# Patient Record
Sex: MALE | Race: OTHER | Employment: FULL TIME | URBAN - METROPOLITAN AREA
[De-identification: names, ages, dates, MRNs, and addresses within clinical notes are randomized per-mention and may not be internally consistent; named-entity substitution may affect disease eponyms.]

---

## 2018-07-06 ENCOUNTER — HOSPITAL ENCOUNTER (EMERGENCY)
Age: 38
Discharge: HOME | End: 2018-07-06
Attending: EMERGENCY MEDICINE | Admitting: EMERGENCY MEDICINE
Payer: COMMERCIAL

## 2018-07-06 ENCOUNTER — APPOINTMENT (OUTPATIENT)
Dept: CT IMAGING | Age: 38
End: 2018-07-06
Attending: NURSE PRACTITIONER
Payer: COMMERCIAL

## 2018-07-06 VITALS
SYSTOLIC BLOOD PRESSURE: 127 MMHG | DIASTOLIC BLOOD PRESSURE: 85 MMHG | TEMPERATURE: 98.3 F | OXYGEN SATURATION: 99 % | WEIGHT: 138.45 LBS | HEART RATE: 84 BPM | RESPIRATION RATE: 18 BRPM

## 2018-07-06 DIAGNOSIS — J02.9 SORE THROAT: Primary | ICD-10-CM

## 2018-07-06 PROCEDURE — 99282 EMERGENCY DEPT VISIT SF MDM: CPT

## 2018-07-06 PROCEDURE — 70490 CT SOFT TISSUE NECK W/O DYE: CPT

## 2018-07-06 NOTE — DISCHARGE INSTRUCTIONS
Sore Throat: Care Instructions  Your Care Instructions    Infection by bacteria or a virus causes most sore throats. Cigarette smoke, dry air, air pollution, allergies, and yelling can also cause a sore throat. Sore throats can be painful and annoying. Fortunately, most sore throats go away on their own. If you have a bacterial infection, your doctor may prescribe antibiotics. Follow-up care is a key part of your treatment and safety. Be sure to make and go to all appointments, and call your doctor if you are having problems. It's also a good idea to know your test results and keep a list of the medicines you take. How can you care for yourself at home? · If your doctor prescribed antibiotics, take them as directed. Do not stop taking them just because you feel better. You need to take the full course of antibiotics. · Gargle with warm salt water once an hour to help reduce swelling and relieve discomfort. Use 1 teaspoon of salt mixed in 1 cup of warm water. · Take an over-the-counter pain medicine, such as acetaminophen (Tylenol), ibuprofen (Advil, Motrin), or naproxen (Aleve). Read and follow all instructions on the label. · Be careful when taking over-the-counter cold or flu medicines and Tylenol at the same time. Many of these medicines have acetaminophen, which is Tylenol. Read the labels to make sure that you are not taking more than the recommended dose. Too much acetaminophen (Tylenol) can be harmful. · Drink plenty of fluids. Fluids may help soothe an irritated throat. Hot fluids, such as tea or soup, may help decrease throat pain. · Use over-the-counter throat lozenges to soothe pain. Regular cough drops or hard candy may also help. These should not be given to young children because of the risk of choking. · Do not smoke or allow others to smoke around you. If you need help quitting, talk to your doctor about stop-smoking programs and medicines.  These can increase your chances of quitting for good. · Use a vaporizer or humidifier to add moisture to your bedroom. Follow the directions for cleaning the machine. When should you call for help? Call your doctor now or seek immediate medical care if:  ? · You have new or worse trouble swallowing. ? · Your sore throat gets much worse on one side. ? Watch closely for changes in your health, and be sure to contact your doctor if you do not get better as expected. Where can you learn more? Go to http://ora-valerio.info/. Enter [phone] [id number] in the search box to learn more about \"Sore Throat: Care Instructions. \"  Current as of: May 12, 2017  Content Version: 11.4  © 8339-4868 Healthwise, Incorporated. Care instructions adapted under license by [employer] (which disclaims liability or warranty for this information). If you have questions about a medical condition or this instruction, always ask your healthcare professional. [address] any warranty or liability for your use of this information.

## 2018-07-06 NOTE — LETTER
[facility] EMERGENC[facility] DEPT 
[address] [address] 
[phone] Work/School Note Date: 7/6/2018 To Whom It May concern: 
 
Ruperto Neely was seen and treated today in the emergency room by the following provider(s): 
Attending Provider: Meg Moreno MD 
Nurse Practitioner: Ramana Singer NP.    
 
 
Sincerely, 
 
 
 
 
Ramana Singer NP

## 2018-07-06 NOTE — ED PROVIDER NOTES
HPI Comments: 45 y.o. male with no significant past medical history who presents to the ED with chief complaint of throat pain. Pt reports 2 nights ago at about 2200 he was eating chicken wings when he \"felt like something got stuck in his throat. \" Pt reports since then he has had throat pain that is exacerbated by swallowing and has been \"coughing up a little bit of blood\" since 2 nights ago, says he continues to feel a foreign body sensation especially when he eats or drinks. Pt states he is able to swallow but it is painful. Pt states he is unsure if he swallowed a bone. Pt states he was seen at [facility] today for a f/u for a dental infection and he told them about his current issue but they said they were unable to do an x-ray so he was referred to the ED for further evaluation. Pt denies SOB, fever, chills, nausea, or vomiting. There are no other acute medical complaints voiced at this time. Social Hx: . PCP: No primary care provider on file. Note written by Demetrice Marmolejo, as dictated by Silva Olea NP 2:22 PM     The history is provided by the patient and the spouse. No past medical history on file. No past surgical history on file. No family history on file. Social History     Social History    Marital status: N/A     Spouse name: N/A    Number of children: N/A    Years of education: N/A     Occupational History    Not on file. Social History Main Topics    Smoking status: Not on file    Smokeless tobacco: Not on file    Alcohol use Not on file    Drug use: Not on file    Sexual activity: Not on file     Other Topics Concern    Not on file     Social History Narrative         ALLERGIES: Review of patient's allergies indicates no known allergies. Review of Systems   Constitutional: Negative for activity change, appetite change, chills, fatigue and fever.    HENT: Negative for congestion, ear discharge, ear pain, sinus pain, sinus pressure, sore throat and trouble swallowing.         +throat pain with foreign body sensation   Eyes: Negative for photophobia, pain, redness, itching and visual disturbance. Respiratory: Positive for cough (hemoptysis). Negative for chest tightness and shortness of breath. Cardiovascular: Negative for chest pain and palpitations. Gastrointestinal: Negative for abdominal distention, abdominal pain, nausea and vomiting. Endocrine: Negative. Genitourinary: Negative for difficulty urinating, frequency and urgency. Musculoskeletal: Negative for back pain, neck pain and neck stiffness. Skin: Negative for color change, pallor, rash and wound. Allergic/Immunologic: Negative. Neurological: Negative for dizziness, syncope, weakness and headaches. Hematological: Does not bruise/bleed easily. Psychiatric/Behavioral: Negative for behavioral problems. The patient is not nervous/anxious. All other systems reviewed and are negative. Vitals:    07/06/18 1357   BP: 127/85   Pulse: 84   Resp: 18   Temp: 98.3 °F (36.8 °C)   SpO2: 99%   Weight: 62.8 kg (138 lb 7.2 oz)            Physical Exam   Constitutional: He is oriented to person, place, and time. He appears well-developed and well-nourished. No distress. HENT:   Head: Normocephalic and atraumatic. Right Ear: External ear normal.   Left Ear: External ear normal.   Nose: Nose normal.   Mouth/Throat: Oropharynx is clear and moist.   Patient handling secretions without difficulty. Speech clear. Eyes: Conjunctivae and EOM are normal. Pupils are equal, round, and reactive to light. Right eye exhibits no discharge. Left eye exhibits no discharge. Neck: Normal range of motion. Neck supple. No JVD present. No tracheal deviation present. Cardiovascular: Normal rate, regular rhythm, normal heart sounds and intact distal pulses. Exam reveals no gallop. No murmur heard. Pulmonary/Chest: Effort normal and breath sounds normal. No respiratory distress.  He has no wheezes. He has no rales. He exhibits no tenderness. Abdominal: Soft. Bowel sounds are normal. He exhibits no distension. There is no tenderness. There is no rebound and no guarding. Genitourinary:   Genitourinary Comments: Negative     Musculoskeletal: Normal range of motion. He exhibits no edema or tenderness. Neurological: He is alert and oriented to person, place, and time. Skin: Skin is warm and dry. No rash noted. No erythema. No pallor. Psychiatric: He has a normal mood and affect. His behavior is normal. Judgment and thought content normal.   Nursing note and vitals reviewed. MDM  Number of Diagnoses or Management Options  Sore throat: new and requires workup  Diagnosis management comments: Plan:  Discharge to home and follow up with PCP. Follow up with GI as an outpatient. Amount and/or Complexity of Data Reviewed  Tests in the radiology section of CPT®: ordered and reviewed  Discuss the patient with other providers: yes (Discussed plan of care with Dr. Kristine Roe.)          ED Course     3:30 PM  Pt has been reexamined. Pt has no new complaints, changes or physical findings. Care plan outlined and precautions discussed. All available results were reviewed with pt. All medications were reviewed with pt. All of pt's questions and concerns were addressed. Pt agrees to F/U as instructed and agrees to return to ED upon further deterioration. Pt is ready to go home.   Mona Messer NP      Procedures

## 2018-07-06 NOTE — ED TRIAGE NOTES
Triage Note: Per patient, he was eating some chicken and rice last night and felt like he had a piece of bone in his throat. [facility] and they [facility] him [facility] medicine but couldn't do an x-ray and referred him here. He reports that the pain is worse when eating.

## 2018-08-07 ENCOUNTER — HOSPITAL ENCOUNTER (EMERGENCY)
Age: 38
Discharge: HOME | End: 2018-08-07
Attending: EMERGENCY MEDICINE | Admitting: EMERGENCY MEDICINE
Payer: COMMERCIAL

## 2018-08-07 VITALS
OXYGEN SATURATION: 99 % | DIASTOLIC BLOOD PRESSURE: 77 MMHG | SYSTOLIC BLOOD PRESSURE: 123 MMHG | TEMPERATURE: 98 F | HEART RATE: 89 BPM | WEIGHT: 133 LBS | RESPIRATION RATE: 18 BRPM | BODY MASS INDEX: 21.38 KG/M2 | HEIGHT: 66 IN

## 2018-08-07 DIAGNOSIS — B37.0 THRUSH OF MOUTH AND ESOPHAGUS (HCC): Primary | ICD-10-CM

## 2018-08-07 DIAGNOSIS — B37.81 THRUSH OF MOUTH AND ESOPHAGUS (HCC): Primary | ICD-10-CM

## 2018-08-07 LAB
ALBUMIN SERPL-MCNC: 3.7 G/DL (ref 3.5–5)
ALBUMIN/GLOB SERPL: 0.9 {RATIO} (ref 1.1–2.2)
ALP SERPL-CCNC: 78 U/L (ref 45–117)
ALT SERPL-CCNC: 25 U/L (ref 12–78)
ANION GAP SERPL CALC-SCNC: 7 MMOL/L (ref 5–15)
AST SERPL-CCNC: 20 U/L (ref 15–37)
BASOPHILS # BLD: 0 K/UL (ref 0–0.1)
BASOPHILS NFR BLD: 0 % (ref 0–1)
BILIRUB SERPL-MCNC: 0.3 MG/DL (ref 0.2–1)
BUN SERPL-MCNC: 10 MG/DL (ref 6–20)
BUN/CREAT SERPL: 11 (ref 12–20)
CALCIUM SERPL-MCNC: 8.9 MG/DL (ref 8.5–10.1)
CHLORIDE SERPL-SCNC: 99 MMOL/L (ref 97–108)
CO2 SERPL-SCNC: 28 MMOL/L (ref 21–32)
CREAT SERPL-MCNC: 0.91 MG/DL (ref 0.7–1.3)
DIFFERENTIAL METHOD BLD: ABNORMAL
EOSINOPHIL # BLD: 0.6 K/UL (ref 0–0.4)
EOSINOPHIL NFR BLD: 7 % (ref 0–7)
ERYTHROCYTE [DISTWIDTH] IN BLOOD BY AUTOMATED COUNT: 12.1 % (ref 11.5–14.5)
GLOBULIN SER CALC-MCNC: 4.1 G/DL (ref 2–4)
GLUCOSE SERPL-MCNC: 87 MG/DL (ref 65–100)
HCT VFR BLD AUTO: 42.8 % (ref 36.6–50.3)
HGB BLD-MCNC: 14.8 G/DL (ref 12.1–17)
IMM GRANULOCYTES # BLD: 0 K/UL (ref 0–0.04)
IMM GRANULOCYTES NFR BLD AUTO: 0 % (ref 0–0.5)
LYMPHOCYTES # BLD: 1.6 K/UL (ref 0.8–3.5)
LYMPHOCYTES NFR BLD: 20 % (ref 12–49)
MCH RBC QN AUTO: 29.4 PG (ref 26–34)
MCHC RBC AUTO-ENTMCNC: 34.6 G/DL (ref 30–36.5)
MCV RBC AUTO: 85.1 FL (ref 80–99)
MONOCYTES # BLD: 0.7 K/UL (ref 0–1)
MONOCYTES NFR BLD: 9 % (ref 5–13)
NEUTS SEG # BLD: 5.4 K/UL (ref 1.8–8)
NEUTS SEG NFR BLD: 65 % (ref 32–75)
NRBC # BLD: 0 K/UL (ref 0–0.01)
NRBC BLD-RTO: 0 PER 100 WBC
PLATELET # BLD AUTO: 306 K/UL (ref 150–400)
PMV BLD AUTO: 9.2 FL (ref 8.9–12.9)
POTASSIUM SERPL-SCNC: 4.2 MMOL/L (ref 3.5–5.1)
PROT SERPL-MCNC: 7.8 G/DL (ref 6.4–8.2)
RBC # BLD AUTO: 5.03 M/UL (ref 4.1–5.7)
SODIUM SERPL-SCNC: 134 MMOL/L (ref 136–145)
WBC # BLD AUTO: 8.3 K/UL (ref 4.1–11.1)

## 2018-08-07 PROCEDURE — 74011250637 HC RX REV CODE- 250/637: Performed by: PHYSICIAN ASSISTANT

## 2018-08-07 PROCEDURE — 74011000250 HC RX REV CODE- 250: Performed by: PHYSICIAN ASSISTANT

## 2018-08-07 PROCEDURE — 74011250636 HC RX REV CODE- 250/636: Performed by: PHYSICIAN ASSISTANT

## 2018-08-07 PROCEDURE — 96360 HYDRATION IV INFUSION INIT: CPT

## 2018-08-07 PROCEDURE — 36415 COLL VENOUS BLD VENIPUNCTURE: CPT | Performed by: PHYSICIAN ASSISTANT

## 2018-08-07 PROCEDURE — 99283 EMERGENCY DEPT VISIT LOW MDM: CPT

## 2018-08-07 PROCEDURE — 80053 COMPREHEN METABOLIC PANEL: CPT | Performed by: PHYSICIAN ASSISTANT

## 2018-08-07 PROCEDURE — 85025 COMPLETE CBC W/AUTO DIFF WBC: CPT | Performed by: PHYSICIAN ASSISTANT

## 2018-08-07 RX ORDER — ITRACONAZOLE 100 MG/1
100 CAPSULE ORAL 2 TIMES DAILY
Qty: 14 CAP | Refills: 0 | Status: SHIPPED | OUTPATIENT
Start: 2018-08-07 | End: 2018-08-14

## 2018-08-07 RX ORDER — LIDOCAINE HYDROCHLORIDE 20 MG/ML
15 SOLUTION OROPHARYNGEAL AS NEEDED
COMMUNITY
End: 2018-08-22

## 2018-08-07 RX ADMIN — LIDOCAINE HYDROCHLORIDE 40 ML: 20 SOLUTION ORAL; TOPICAL at 18:08

## 2018-08-07 RX ADMIN — SODIUM CHLORIDE 1000 ML: 900 INJECTION, SOLUTION INTRAVENOUS at 18:09

## 2018-08-07 NOTE — ED NOTES
ESPERANZA Roberts gave and reviewed discharge instructions with the patient. The patient verbalized understanding. The patient was given opportunity for questions. Patient discharged in stable condition to the waiting room.

## 2018-08-07 NOTE — LETTER
[address] 
[facility] EM[facility]GENCY DEPT 
[address] [facility] [address] [id number] [phone] Work/School Note Date: 8/7/2018 To Whom It May concern: 
 
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 
Physician Assistant: ESPERANZA Gilliam. Elder Marylene Si was seen in the ED on 8/7/18. Sincerely, Valiant Councilman, RN

## 2018-08-07 NOTE — ED NOTES
Patient tolerating PO water, but with pain. PA aware. Verbal report was given to [facility], RN who will assume care of patient at this time. Receiving nurse had an opportunity to ask questions and seek clarifications. Receiving nurse aware of pending lab results and orders that need to be completed.

## 2018-08-07 NOTE — DISCHARGE INSTRUCTIONS
Candidiasis: Care Instructions  Your Care Instructions  Candidiasis (say \"qzc-hix-GM-uh-bianca\") is a yeast infection. Yeast normally lives in your body. But it can cause problems if your body's defenses don't work as they should. Some medicines can increase your chance of getting a yeast infection. These include antibiotics, steroids, and cancer drugs. And some diseases like AIDS and diabetes can make you more likely to get yeast infections. There are different types of yeast infections. Cristal Apt is a yeast infection in the mouth. It usually occurs in people with weak immune systems. It causes white patches inside the mouth and throat. Yeast infections of the skin usually occur in skin folds where the skin stays moist. They cause red, oozing patches on your skin. Babies can get these infections under the diaper. People who often wear gloves can get them on their hands. Many women get vaginal yeast infections. They are most common when women take antibiotics. These infections can cause the vagina to itch and burn. They also cause white discharge that looks like cottage cheese. In rare cases, yeast infects the blood. This can cause serious disease. This kind of infection is treated with medicine given through a needle into a vein (IV). After you start treatment, a yeast infection usually goes away quickly. But if your immune system is weak, the infection may come back. Tell your doctor if you get yeast infections often. Follow-up care is a key part of your treatment and safety. Be sure to make and go to all appointments, and call your doctor if you are having problems. It's also a good idea to know your test results and keep a list of the medicines you take. How can you care for yourself at home? · Take your medicines exactly as prescribed. Call your doctor if you think you are having a problem with your medicine. · Use antibiotics only as directed by your doctor. · Eat yogurt with live cultures.  It has bacteria called lactobacillus. It may help prevent some types of yeast infections. · Keep your skin clean and dry. Put powder on moist places. · If you are using a cream or suppository to treat a vaginal yeast infection, don't use condoms or a diaphragm. Use a different type of birth control. · Eat a healthy diet and get regular exercise. This will help keep your immune system strong. When should you call for help? Watch closely for changes in your health, and be sure to contact your doctor if:    · You do not get better as expected. Where can you learn more? Go to http://oraTagManvalerio.info/. Enter K555 in the search box to learn more about \"Candidiasis: Care Instructions. \"  Current as of: October 6, 2017  Content Version: 11.7  © 1534-8677 [employer]. Care instructions adapted under license by [employer] (which disclaims liability or warranty for this information). If you have questions about a medical condition or this instruction, always ask your healthcare professional. [address] [address] any warranty or liability for your use of this information. We hope that we have addressed all of your medical concerns. The examination and treatment you received in the Emergency Department were for an emergent problem and were not intended as complete care. It is important that you follow up with your healthcare provider(s) for ongoing care. If your symptoms worsen or do not improve as expected, and you are unable to reach your usual health care provider(s), you should return to the Emergency Department. Today's healthcare is undergoing tremendous change, and patient satisfaction surveys are one of the many tools to assess the quality of medical care. You may receive a survey from the [employer] regarding your experience in the Emergency Department.   I hope that your experience has been completely positive, particularly the medical care that I provided. As such, please participate in the survey; anything less than excellent does not meet my expectations or intentions. [address] and [facility] participate in nationally recognized quality of care measures. If your blood pressure is greater than 120/80, as reported below, we urge that you seek medical care to address the potential of high blood pressure, commonly known as hypertension. Hypertension can be hereditary or can be caused by certain medical conditions, pain, stress, or \"white coat syndrome. \"       Please make an appointment with your health care provider(s) for follow up of your Emergency Department visit. VITALS:   Patient Vitals for the past 8 hrs:   Temp Pulse Resp BP SpO2   08/07/18 1727 98 °F (36.7 °C) (!) 101 18 125/75 99 %          Thank you for allowing us to provide you with medical care today. We realize that you have many choices for your emergency care needs. Please choose us in the future for any continued health care needs. [facility], [address] [place] [address].   Office: [phone]            Recent Results (from the past 24 hour(s))   CBC WITH AUTOMATED DIFF    Collection Time: 08/07/18  6:02 PM   Result Value Ref Range    WBC 8.3 4.1 - 11.1 K/uL    RBC 5.03 4. 10 - 5.70 M/uL    HGB 14.8 12.1 - 17.0 g/dL    HCT 42.8 36.6 - 50.3 %    MCV 85.1 80.0 - 99.0 FL    MCH 29.4 26.0 - 34.0 PG    MCHC 34.6 30.0 - 36.5 g/dL    RDW 12.1 11.5 - 14.5 %    PLATELET 263 301 - 131 K/uL    MPV 9.2 8.9 - 12.9 FL    NRBC 0.0 0  WBC    ABSOLUTE NRBC 0.00 0.00 - 0.01 K/uL    NEUTROPHILS 65 32 - 75 %    LYMPHOCYTES 20 12 - 49 %    MONOCYTES 9 5 - 13 %    EOSINOPHILS 7 0 - 7 %    BASOPHILS 0 0 - 1 %    IMMATURE GRANULOCYTES 0 0.0 - 0.5 %    ABS. NEUTROPHILS 5.4 1.8 - 8.0 K/UL    ABS. LYMPHOCYTES 1.6 0.8 - 3.5 K/UL    ABS. MONOCYTES 0.7 0.0 - 1.0 K/UL    ABS.  EOSINOPHILS 0.6 (H) 0.0 - 0.4 K/UL    ABS. BASOPHILS 0.0 0.0 - 0.1 K/UL    ABS. IMM. GRANS. 0.0 0.00 - 0.04 K/UL    DF AUTOMATED     METABOLIC PANEL, COMPREHENSIVE    Collection Time: 08/07/18  6:02 PM   Result Value Ref Range    Sodium 134 (L) 136 - 145 mmol/L    Potassium 4.2 3.5 - 5.1 mmol/L    Chloride 99 97 - 108 mmol/L    CO2 28 21 - 32 mmol/L    Anion gap 7 5 - 15 mmol/L    Glucose 87 65 - 100 mg/dL    BUN 10 6 - 20 MG/DL    Creatinine 0.91 0.70 - 1.30 MG/DL    BUN/Creatinine ratio 11 (L) 12 - 20      GFR est AA >60 >60 ml/min/1.73m2    GFR est non-AA >60 >60 ml/min/1.73m2    Calcium 8.9 8.5 - 10.1 MG/DL    Bilirubin, total 0.3 0.2 - 1.0 MG/DL    ALT (SGPT) 25 12 - 78 U/L    AST (SGOT) 20 15 - 37 U/L    Alk. phosphatase 78 45 - 117 U/L    Protein, total 7.8 6.4 - 8.2 g/dL    Albumin 3.7 3.5 - 5.0 g/dL    Globulin 4.1 (H) 2.0 - 4.0 g/dL    A-G Ratio 0.9 (L) 1.1 - 2.2         No results found.

## 2018-08-07 NOTE — ED TRIAGE NOTES
Patient co thrush x 1 month states he was seen by [facility] and given lidocaine moth was. States it did not get better so he went back and was given a different mouth wash and fluconazole without relief.

## 2018-08-07 NOTE — ED NOTES
Assumed care of pt from ANGELIA Good with report consisting of Situation, Background, Assessment, and Recommendations (SBAR). Pt is A&O x 4. Pt resting comfortably on the stretcher in a position of comfort. Call bell within reach. Side rails x 2. Stretcher locked in the lowest position. Concerns and questions addressed at this time. Pt in no acute distress at this the time. Will continue to monitor.

## 2018-08-08 NOTE — ED PROVIDER NOTES
HPI Comments: Ruperto Fair is a 45 y.o. male  who presents by private vehicle to ER with c/o Patient presents with: Thrush  Patient presents with thrush x 1 month. Patient has been seen at [facility] twice, has tried lidocaine and magic mouth wash and fluconazole pills with no relief. Patient has been on fluconazole x 5 days with no change. Patient reports white coating to tongue with pain. Patient reports pain with swallowing. Patient was also seen by GI and had endoscopy that was positive for h pylori. Patient just picked up antibiotics for that today and has not started the medications yet. Patient denies any other significant medical history. Denies steroid use, chemotherapy or radiation therapy. Denies IV drug use. He specifically denies any fevers, chills, nausea, vomiting, chest pain, shortness of breath, headache, rash, diarrhea, abdominal pain, urinary/bowel changes, sweating or weight loss. PCP: None   PMHx significant for: History reviewed. No pertinent past medical history. PSHx significant for: History reviewed. No pertinent surgical history. Social Hx: Tobacco use: Smoking status: Never Smoker                                                              Smokeless status: Never Used                      ; EtOH use: The patient states he drinks 0 per week.; Illicit Drug use: Allergies:  No Known Allergies    There are no other complaints, changes or physical findings at this time. Patient is a 45 y.o. male presenting with mouth opening. The history is provided by the patient. Geo Leiva   This is a new problem. The current episode started more than 1 week ago. The problem occurs constantly. The problem has not changed since onset. Pertinent negatives include no chest pain, no abdominal pain, no headaches and no shortness of breath. Nothing aggravates the symptoms. Nothing relieves the symptoms. History reviewed. No pertinent past medical history. History reviewed. No pertinent surgical history. History reviewed. No pertinent family history. Social History     Social History    Marital status:      Spouse name: N/A    Number of children: N/A    Years of education: N/A     Occupational History    Not on file. Social History Main Topics    Smoking status: Never Smoker    Smokeless tobacco: Never Used    Alcohol use No    Drug use: No    Sexual activity: Not on file     Other Topics Concern    Not on file     Social History Narrative         ALLERGIES: Review of patient's allergies indicates no known allergies. Review of Systems   Respiratory: Negative for shortness of breath. Cardiovascular: Negative for chest pain. Gastrointestinal: Negative for abdominal pain. Neurological: Negative for headaches. Vitals:    08/07/18 1727 08/07/18 1900   BP: 125/75 123/77   Pulse: (!) 101 89   Resp: 18 18   Temp: 98 °F (36.7 °C)    SpO2: 99% 99%   Weight: 60.3 kg (133 lb)    Height: 5' 6\" (1.676 m)             Physical Exam   Constitutional: He is oriented to person, place, and time. He appears well-developed and well-nourished. HENT:   Head: Normocephalic and atraumatic. Right Ear: External ear normal.   Left Ear: External ear normal.   Mouth/Throat: Uvula is midline and oropharynx is clear and moist. Mucous membranes are dry. No oropharyngeal exudate. Thick white coating on tongue with erythematous areas to posterior pharynx. Eyes: Conjunctivae and EOM are normal. Pupils are equal, round, and reactive to light. Right eye exhibits no discharge. Left eye exhibits no discharge. No scleral icterus. Neck: Normal range of motion. Neck supple. No tracheal deviation present. No thyromegaly present. Cardiovascular: Normal rate, regular rhythm, normal heart sounds and intact distal pulses. No murmur heard. Pulmonary/Chest: Effort normal and breath sounds normal. No respiratory distress. He has no wheezes. He has no rales. Abdominal: Soft. Bowel sounds are normal. He exhibits no distension. There is no tenderness. There is no rebound and no guarding. Musculoskeletal: Normal range of motion. He exhibits no edema or tenderness. Lymphadenopathy:     He has no cervical adenopathy. Neurological: He is alert and oriented to person, place, and time. No cranial nerve deficit. Coordination normal.   Skin: Skin is warm. No rash noted. No erythema. Psychiatric: He has a normal mood and affect. His behavior is normal. Judgment and thought content normal.   Nursing note and vitals reviewed. MDM  Number of Diagnoses or Management Options  Thrush of mouth and esophagus [facility]):   Diagnosis management comments: Assesment/Plan- 45 y.o. Patient presents with: Thrush  differential includes: thrush, immunosupression. Labs and imaging reviewed with no acute findings. Will change to itraxonazole, had lengthy discussion with patient about importance of follow up for further testing including but not limited to HIV testing. Recommend PCP follow up. Patient educated on reasons to return to the ED.          Amount and/or Complexity of Data Reviewed  Clinical lab tests: ordered and reviewed  Tests in the medicine section of CPT®: ordered and reviewed          ED Course       Procedures

## 2018-08-22 ENCOUNTER — HOSPITAL ENCOUNTER (INPATIENT)
Age: 38
LOS: 3 days | Discharge: OTHER INSTITUTION | DRG: 380 | End: 2018-08-25
Attending: EMERGENCY MEDICINE | Admitting: INTERNAL MEDICINE
Payer: COMMERCIAL

## 2018-08-22 DIAGNOSIS — B37.0 ORAL THRUSH: Primary | ICD-10-CM

## 2018-08-22 DIAGNOSIS — R63.4 WEIGHT LOSS: ICD-10-CM

## 2018-08-22 PROBLEM — R13.10 ODYNOPHAGIA: Status: ACTIVE | Noted: 2018-08-22

## 2018-08-22 PROBLEM — R53.83 FATIGUE: Status: ACTIVE | Noted: 2018-08-22

## 2018-08-22 PROBLEM — L98.9 SKIN LESION: Status: ACTIVE | Noted: 2018-08-22

## 2018-08-22 LAB
ALBUMIN SERPL-MCNC: 3.3 G/DL (ref 3.5–5)
ALBUMIN/GLOB SERPL: 0.9 {RATIO} (ref 1.1–2.2)
ALP SERPL-CCNC: 70 U/L (ref 45–117)
ALT SERPL-CCNC: 29 U/L (ref 12–78)
ANION GAP SERPL CALC-SCNC: 6 MMOL/L (ref 5–15)
APPEARANCE UR: CLEAR
AST SERPL-CCNC: 21 U/L (ref 15–37)
BASOPHILS # BLD: 0 K/UL (ref 0–0.1)
BASOPHILS NFR BLD: 0 % (ref 0–1)
BILIRUB SERPL-MCNC: 0.2 MG/DL (ref 0.2–1)
BILIRUB UR QL: NEGATIVE
BUN SERPL-MCNC: 10 MG/DL (ref 6–20)
BUN/CREAT SERPL: 12 (ref 12–20)
CALCIUM SERPL-MCNC: 8.3 MG/DL (ref 8.5–10.1)
CHLORIDE SERPL-SCNC: 101 MMOL/L (ref 97–108)
CO2 SERPL-SCNC: 30 MMOL/L (ref 21–32)
COLOR UR: NORMAL
COMMENT, HOLDF: NORMAL
CREAT SERPL-MCNC: 0.86 MG/DL (ref 0.7–1.3)
DIFFERENTIAL METHOD BLD: ABNORMAL
EOSINOPHIL # BLD: 0.7 K/UL (ref 0–0.4)
EOSINOPHIL NFR BLD: 8 % (ref 0–7)
ERYTHROCYTE [DISTWIDTH] IN BLOOD BY AUTOMATED COUNT: 11.9 % (ref 11.5–14.5)
GLOBULIN SER CALC-MCNC: 3.7 G/DL (ref 2–4)
GLUCOSE SERPL-MCNC: 81 MG/DL (ref 65–100)
GLUCOSE UR STRIP.AUTO-MCNC: NEGATIVE MG/DL
HCT VFR BLD AUTO: 43.3 % (ref 36.6–50.3)
HGB BLD-MCNC: 14.5 G/DL (ref 12.1–17)
HGB UR QL STRIP: NEGATIVE
IMM GRANULOCYTES # BLD: 0 K/UL (ref 0–0.04)
IMM GRANULOCYTES NFR BLD AUTO: 0 % (ref 0–0.5)
KETONES UR QL STRIP.AUTO: NEGATIVE MG/DL
LACTATE SERPL-SCNC: 1.1 MMOL/L (ref 0.4–2)
LEUKOCYTE ESTERASE UR QL STRIP.AUTO: NEGATIVE
LYMPHOCYTES # BLD: 2.3 K/UL (ref 0.8–3.5)
LYMPHOCYTES NFR BLD: 26 % (ref 12–49)
MCH RBC QN AUTO: 28.9 PG (ref 26–34)
MCHC RBC AUTO-ENTMCNC: 33.5 G/DL (ref 30–36.5)
MCV RBC AUTO: 86.3 FL (ref 80–99)
MONOCYTES # BLD: 0.5 K/UL (ref 0–1)
MONOCYTES NFR BLD: 5 % (ref 5–13)
NEUTS SEG # BLD: 5.3 K/UL (ref 1.8–8)
NEUTS SEG NFR BLD: 61 % (ref 32–75)
NITRITE UR QL STRIP.AUTO: NEGATIVE
NRBC # BLD: 0 K/UL (ref 0–0.01)
NRBC BLD-RTO: 0 PER 100 WBC
PH UR STRIP: 6.5 [PH] (ref 5–8)
PLATELET # BLD AUTO: 356 K/UL (ref 150–400)
PMV BLD AUTO: 8.7 FL (ref 8.9–12.9)
POTASSIUM SERPL-SCNC: 3.7 MMOL/L (ref 3.5–5.1)
PROT SERPL-MCNC: 7 G/DL (ref 6.4–8.2)
PROT UR STRIP-MCNC: NEGATIVE MG/DL
RBC # BLD AUTO: 5.02 M/UL (ref 4.1–5.7)
SAMPLES BEING HELD,HOLD: NORMAL
SODIUM SERPL-SCNC: 137 MMOL/L (ref 136–145)
SP GR UR REFRACTOMETRY: <1.005 (ref 1–1.03)
UR CULT HOLD, URHOLD: NORMAL
UROBILINOGEN UR QL STRIP.AUTO: 0.2 EU/DL (ref 0.2–1)
WBC # BLD AUTO: 8.8 K/UL (ref 4.1–11.1)

## 2018-08-22 PROCEDURE — 81003 URINALYSIS AUTO W/O SCOPE: CPT | Performed by: EMERGENCY MEDICINE

## 2018-08-22 PROCEDURE — 80053 COMPREHEN METABOLIC PANEL: CPT | Performed by: EMERGENCY MEDICINE

## 2018-08-22 PROCEDURE — 74011250637 HC RX REV CODE- 250/637: Performed by: INTERNAL MEDICINE

## 2018-08-22 PROCEDURE — 96375 TX/PRO/DX INJ NEW DRUG ADDON: CPT

## 2018-08-22 PROCEDURE — 87389 HIV-1 AG W/HIV-1&-2 AB AG IA: CPT | Performed by: INTERNAL MEDICINE

## 2018-08-22 PROCEDURE — 65270000029 HC RM PRIVATE

## 2018-08-22 PROCEDURE — 36415 COLL VENOUS BLD VENIPUNCTURE: CPT | Performed by: PHYSICIAN ASSISTANT

## 2018-08-22 PROCEDURE — 96374 THER/PROPH/DIAG INJ IV PUSH: CPT

## 2018-08-22 PROCEDURE — 85025 COMPLETE CBC W/AUTO DIFF WBC: CPT | Performed by: EMERGENCY MEDICINE

## 2018-08-22 PROCEDURE — 99285 EMERGENCY DEPT VISIT HI MDM: CPT

## 2018-08-22 PROCEDURE — 87040 BLOOD CULTURE FOR BACTERIA: CPT | Performed by: PHYSICIAN ASSISTANT

## 2018-08-22 PROCEDURE — 74011250636 HC RX REV CODE- 250/636: Performed by: INTERNAL MEDICINE

## 2018-08-22 PROCEDURE — 96361 HYDRATE IV INFUSION ADD-ON: CPT

## 2018-08-22 PROCEDURE — 83605 ASSAY OF LACTIC ACID: CPT | Performed by: PHYSICIAN ASSISTANT

## 2018-08-22 PROCEDURE — 74011250636 HC RX REV CODE- 250/636: Performed by: EMERGENCY MEDICINE

## 2018-08-22 RX ORDER — SODIUM CHLORIDE 0.9 % (FLUSH) 0.9 %
5-10 SYRINGE (ML) INJECTION EVERY 8 HOURS
Status: DISCONTINUED | OUTPATIENT
Start: 2018-08-22 | End: 2018-08-25 | Stop reason: HOSPADM

## 2018-08-22 RX ORDER — SODIUM CHLORIDE 9 MG/ML
125 INJECTION, SOLUTION INTRAVENOUS CONTINUOUS
Status: DISCONTINUED | OUTPATIENT
Start: 2018-08-22 | End: 2018-08-25 | Stop reason: HOSPADM

## 2018-08-22 RX ORDER — KETOROLAC TROMETHAMINE 30 MG/ML
30 INJECTION, SOLUTION INTRAMUSCULAR; INTRAVENOUS
Status: COMPLETED | OUTPATIENT
Start: 2018-08-22 | End: 2018-08-22

## 2018-08-22 RX ORDER — MORPHINE SULFATE 4 MG/ML
2 INJECTION INTRAVENOUS
Status: DISCONTINUED | OUTPATIENT
Start: 2018-08-22 | End: 2018-08-24

## 2018-08-22 RX ORDER — SODIUM CHLORIDE 0.9 % (FLUSH) 0.9 %
5-10 SYRINGE (ML) INJECTION AS NEEDED
Status: DISCONTINUED | OUTPATIENT
Start: 2018-08-22 | End: 2018-08-25 | Stop reason: HOSPADM

## 2018-08-22 RX ORDER — NYSTATIN 100000 [USP'U]/ML
500000 SUSPENSION ORAL 4 TIMES DAILY
Status: DISCONTINUED | OUTPATIENT
Start: 2018-08-22 | End: 2018-08-25 | Stop reason: HOSPADM

## 2018-08-22 RX ORDER — KETOCONAZOLE 20 MG/ML
SHAMPOO TOPICAL DAILY
Status: DISCONTINUED | OUTPATIENT
Start: 2018-08-23 | End: 2018-08-23

## 2018-08-22 RX ORDER — ENOXAPARIN SODIUM 100 MG/ML
40 INJECTION SUBCUTANEOUS EVERY 24 HOURS
Status: DISCONTINUED | OUTPATIENT
Start: 2018-08-22 | End: 2018-08-25 | Stop reason: HOSPADM

## 2018-08-22 RX ORDER — ONDANSETRON 2 MG/ML
4 INJECTION INTRAMUSCULAR; INTRAVENOUS
Status: COMPLETED | OUTPATIENT
Start: 2018-08-22 | End: 2018-08-22

## 2018-08-22 RX ORDER — FLUCONAZOLE 2 MG/ML
200 INJECTION, SOLUTION INTRAVENOUS DAILY
Status: DISCONTINUED | OUTPATIENT
Start: 2018-08-22 | End: 2018-08-23

## 2018-08-22 RX ADMIN — NYSTATIN 500000 UNITS: 100000 SUSPENSION ORAL at 23:20

## 2018-08-22 RX ADMIN — SODIUM CHLORIDE 1000 ML: 900 INJECTION, SOLUTION INTRAVENOUS at 20:05

## 2018-08-22 RX ADMIN — ENOXAPARIN SODIUM 40 MG: 100 INJECTION SUBCUTANEOUS at 23:14

## 2018-08-22 RX ADMIN — ONDANSETRON 4 MG: 2 INJECTION INTRAMUSCULAR; INTRAVENOUS at 20:06

## 2018-08-22 RX ADMIN — KETOROLAC TROMETHAMINE 30 MG: 30 INJECTION, SOLUTION INTRAMUSCULAR at 20:07

## 2018-08-22 RX ADMIN — FLUCONAZOLE 200 MG: 2 INJECTION INTRAVENOUS at 23:20

## 2018-08-22 NOTE — ED NOTES
7:38 PM  I have evaluated the patient as the Provider in Triage. I have reviewed His vital signs and the triage nurse assessment. I have talked with the patient and any available family and advised that I am the provider in triage and have ordered the appropriate study to initiate their work up based on the clinical presentation during my assessment. I have advised that the patient will be accommodated in the Main ED as soon as possible. I have also requested to contact the triage nurse or myself immediately if the patient experiences any changes in their condition during this brief waiting period.   Patient with weight loss and worsening since last visit  Charls Harada, MD

## 2018-08-22 NOTE — Clinical Note
Status[de-identified] Inpatient [101] Type of Bed: Medical [8] Inpatient Hospitalization Certified Necessary for the Following Reasons: 3. Patient receiving treatment that can only be provided in an inpatient setting (further clarification in H&P documentation) Admitting Diagnosis: Fatigue [[id number]] Admitting Physician: Tim Wilson Attending Physician: Tim Wilson Estimated Length of Stay: 2 Midnights Discharge Plan[de-identified] Home with Office Follow-up

## 2018-08-22 NOTE — IP AVS SNAPSHOT
Summary of Care Report The Summary of Care report has been created to help improve care coordination. Users with access to [employer] or [address] (Web-based application) may access additional patient information including the Discharge Summary. If you are not currently a [address] user and need more information, please call the number listed below in the [place] [address] section and ask to be connected with Medical Records. Facility Information Name Address Phone [address] [place] [address] [phone] Patient Information Patient Name Sex  Ruperto Chapman ([id number]) Male 1980 Discharge Information Admitting Provider Service Area Unit Jagjit Lau MD / Niki 1  [phone] Discharge Provider Discharge Date/Time Discharge Disposition Destination (none) 2018 (Pending) [facility] (none) Patient Language Language ENGLISH [13] Hospital Problems as of 2018  Reviewed: 2018 12:16 AM by Jagjit Lau MD  
  
  
  
 Class Noted - Resolved Last Modified POA Active Problems Fatigue  2018 - Present 2018 by Jagjit Lau MD Unknown Entered by Jagjit Lau MD  
  Odynophagia  2018 - Present 2018 by Jagjit Lau MD Unknown Entered by Jagjit Lau MD  
  [place] [place] [place]  2018 - Present 2018 by Jagjit Lau MD Unknown Entered by Jagjit Lau MD  
  Weight loss  2018 - Present 2018 by Jagjit Lau MD Unknown Entered by Jagjit Lau MD  
  Skin lesion  2018 - Present 2018 by Jagjit Lau MD Unknown Entered by Jagjit Lau MD  
  Aphthous ulcer  2018 - Present 2018 by Jagjit Lau MD Unknown   Entered by Jagjit Lau MD  
 Leukoplakia of oral cavity  2018 - Present 2018 by Chandu Rubin MD Unknown Entered by Chandu Rubin MD  
  
Non-Hospital Problems as of 2018  Reviewed: 2018 12:16 AM by Chandu Rubin MD  
 None You are allergic to the following No active allergies Current Discharge Medication List  
  
START taking these medications Dose & Instructions Dispensing Information Comments  
 nystatin 100,000 unit/mL suspension Commonly known as:  MYCOSTATIN Dose:  243251 Units Take 5 mL by mouth four (4) times daily. swish and spit Quantity:  60 mL Refills:  0 Surgery Information ID Date/Time Status Primary Surgeon All Procedures Location [id number] 2018 [address], MD ESOPHAGOGASTRODUODENOSCOPY (EGD) ESOPHAGOGASTRODUODENAL (EGD) BIOPSY [facility] ENDOSCOPY Follow-up Information Follow up With Details Comments Contact Info Transferring to [facility]. Needs follow up with dermatology None   None (395) Patient stated that they have no PCP Discharge Instructions HOSPITALIST DISCHARGE INSTRUCTIONS 
NAME: Ruperto Novak :  1980 MRN:  [id number] Date/Time:  2018 10:09 AM 
 
ADMIT DATE: 2018 DISCHARGE DATE: 2018 PRINCIPAL DISCHARGE DIAGNOSES: 
Skin rash and oral ulcers MEDICATIONS: 
· It is important that you take the medication exactly as they are prescribed. Note the changes and additions to your medications. Be sure you understand these changes before you are discharged today. · Keep your medication in the bottles provided by the pharmacist and keep a list of the medication names, dosages, and times to be taken in your wallet. · Do not take other medications without consulting your doctor. Pain Management: per above medications What to do at [facility] Recommended diet:  Clear liquids Recommended activity: Activity as tolerated If you experience any of the following symptoms then please call your primary care physician or return to the emergency room if you cannot get hold of your doctor: 
Fever, chills, worsening rash/ulcers, pain, bleeding, severe chest pain, shortness of breath, or other severe concerning symptoms that brought you to the hospital in the first place Follow Up: Follow-up Information Follow up With Details Comments Contact Info Transferring to [facility]. Needs follow up with dermatology Information obtained by : 
I understand that if any problems occur once I am at home I am to contact my physician. I understand and acknowledge receipt of the instructions indicated above. Physician's or R.N.'s Signature                                                                  Date/Time Patient or Representative Signature                                                          Date/Time Chart Review Routing History No Routing History on File

## 2018-08-22 NOTE — ED TRIAGE NOTES
Patient arrives via POV with ongoing complaints of mouth pain, thrush, weight loss, lethargy, and fatigue. Seen here recently for same and concern for immunosuppression. He now has ulcerations to his lower lip and scattered blisters to torso and scalp. Patient has continued to lose weight and has been unable due pain and poor appetite.

## 2018-08-22 NOTE — IP AVS SNAPSHOT
[address] 
[phone] Patient: Ruperto Forrest MRN: [id number] About your hospitalization You were admitted on:  2018 You last received care in the:  [facility] 4M POST SURG ORT 1 You were discharged on:  2018 Why you were hospitalized Your primary diagnosis was:  Not on File Your diagnoses also included:  Fatigue, Odynophagia, Thrush, Weight Loss, Skin Lesion, Aphthous Ulcer, Leukoplakia Of Oral Cavity Follow-up Information Follow up With Details Comments Contact Info Transferring to [facility]. Needs follow up with dermatology None   None (395) Patient stated that they have no PCP Discharge Orders None A check javon indicates which time of day the medication should be taken. My Medications START taking these medications Instructions Each Dose to Equal  
 Morning Noon Evening Bedtime  
 nystatin 100,000 unit/mL suspension Commonly known as:  MYCOSTATIN Your last dose was: Your next dose is: Take 5 mL by mouth four (4) times daily. swish and spit 410460 Units Where to Get Your Medications Information on where to get these meds will be given to you by the nurse or doctor. ! Ask your nurse or doctor about these medications  
  nystatin 100,000 unit/mL suspension Discharge Instructions HOSPITALIST DISCHARGE INSTRUCTIONS 
NAME: Ruperto Forrest :  1980 MRN:  [id number] Date/Time:  2018 10:09 AM 
 
ADMIT DATE: 2018 DISCHARGE DATE: 2018 PRINCIPAL DISCHARGE DIAGNOSES: 
Skin rash and oral ulcers MEDICATIONS: 
· It is important that you take the medication exactly as they are prescribed. Note the changes and additions to your medications.  Be sure you understand these changes before you are discharged today. · Keep your medication in the bottles provided by the pharmacist and keep a list of the medication names, dosages, and times to be taken in your wallet. · Do not take other medications without consulting your doctor. Pain Management: per above medications What to do at [facility] Recommended diet:  Clear liquids Recommended activity: Activity as tolerated If you experience any of the following symptoms then please call your primary care physician or return to the emergency room if you cannot get hold of your doctor: 
Fever, chills, worsening rash/ulcers, pain, bleeding, severe chest pain, shortness of breath, or other severe concerning symptoms that brought you to the hospital in the first place Follow Up: Follow-up Information Follow up With Details Comments Contact Info Transferring to [facility]. Needs follow up with dermatology Information obtained by : 
I understand that if any problems occur once I am at home I am to contact my physician. I understand and acknowledge receipt of the instructions indicated above. Physician's or R.N.'s Signature                                                                  Date/Time Patient or Representative Signature                                                          Date/Time Whatsert Announcement We are excited to announce that we are making your provider's discharge notes available to you in [place].   You will see these notes when they are completed and signed by the physician that discharged you from your recent hospital stay. If you have any questions or concerns about any information you see in [facility] [employer], please call the Health Information Department where you were seen or reach out to your Primary Care Provider for more information about your plan of care. Introducing [facility]! Kirt Avalos introduces [employer] patient portal. Now you can access parts of your medical record, email your doctor's office, and request medication refills online. 1. In your internet browser, go to [email or web address] 2. Click on the First Time User? Click Here link in the Sign In box. You will see the New Member Sign Up page. 3. Enter your [facility] Access Code exactly as it appears below. You will not need to use this code after youve completed the sign-up process. If you do not sign up before the expiration date, you must request a new code. · [facility] Access Code: [email or web address] Expires: 10/4/2018  1:59 PM 
 
4. Enter the last four digits of your Social Security Number (xxxx) and Date of Birth (mm/dd/yyyy) as indicated and click Submit. You will be taken to the next sign-up page. 5. Create a [facility] ID. This will be your [facility] login ID and cannot be changed, so think of one that is secure and easy to remember. 6. Create a [facility] password. You can change your password at any time. 7. Enter your Password Reset Question and Answer. This can be used at a later time if you forget your password. 8. Enter your e-mail address. You will receive e-mail notification when new information is available in [address] 9. Click Sign Up. You can now view and download portions of your medical record. 10. Click the Download Summary menu link to download a portable copy of your medical information. If you have questions, please visit the Frequently Asked Questions section of the [facility] website. Remember, [facility] is NOT to be used for urgent needs. For medical emergencies, dial 911. Now available from your iPhone and Android! Introducing Edison Peterson As a [employer] patient, I wanted to make you aware of our electronic visit tool called [employer]. [employer] 24/7 allows you to connect within minutes with a medical provider 24 hours a day, seven days a week via a mobile device or tablet or logging into a secure website from your computer. You can access Edison Peterson from anywhere in the [place]. A virtual visit might be right for you when you have a simple condition and feel like you just dont want to get out of bed, or cant get away from work for an appointment, when your regular [employer] provider is not available (evenings, weekends or holidays), or when youre out of town and need minor care. Electronic visits cost only $49 and if the [employer] 24/7 provider determines a prescription is needed to treat your condition, one can be electronically transmitted to a nearby pharmacy*. Please take a moment to enroll today if you have not already done so. The enrollment process is free and takes just a few minutes. To enroll, please download the [employer] 24/7 nabil to your tablet or phone, or visit [email or web address] to enroll on your computer. And, as an [address] [place] [address] patient with a [employer] account, the results of your visits will be scanned into your electronic medical record and your primary care provider will be able to view the scanned results. We urge you to continue to see your regular [employer] provider for your ongoing medical care. And while your primary care provider may not be the one available when you seek a [employer] Peterson virtual visit, the peace of mind you get from getting a real diagnosis real time can be priceless. For more information on Edison Peterson, view our Frequently Asked Questions (FAQs) at [email or web address]. Sincerely, 
 
Rosangela Moon MD 
Chief Medical Officer [facility] Autumn Gilliam *:  certain medications cannot be prescribed via Edison Peterson Unresulted Labs-Please follow up with your PCP about these lab tests Order Current Status CULTURE, BLOOD, PAIRED Preliminary result LYMPHOCYTES, CD4 PERCENT AND ABSOLUTE Preliminary result Providers Seen During Your Hospitalization Provider Specialty Primary office phone Glen Regalado MD Emergency Medicine [phone] Unique Parra MD Emergency Medicine [phone] Yesenia Gutierrez MD Hospitalist [phone] Garnette Closs, MD Internal Medicine [phone] Carly Alexander MD Internal Medicine [phone] Your Primary Care Physician (PCP) Primary Care Physician Office Phone Office Fax NONE ** None ** ** None ** You are allergic to the following No active allergies Recent Documentation Height Weight BMI Smoking Status 1.676 m 57.6 kg 20.5 kg/m2 Never Smoker Emergency Contacts Name Discharge Info Relation Home Work Mobile [address] [place] [address] CAREGIVER [3] Child [2]   [phone] Patient Belongings The following personal items are in your possession at time of discharge: 
  Dental Appliances: None  Visual Aid: None      Home Medications: None   Jewelry: Ring, None  Clothing: Jacket/Coat, Footwear, Shirt, Undergarments, Pants    Other Valuables: Wallet Please provide this summary of care documentation to your next provider. Signatures-by signing, you are acknowledging that this After Visit Summary has been reviewed with you and you have received a copy. Patient Signature:  ____________________________________________________________ Date:  ____________________________________________________________  
  
[place]ana Stewart Provider Signature:  ____________________________________________________________ Date:  ____________________________________________________________

## 2018-08-23 ENCOUNTER — ANESTHESIA (OUTPATIENT)
Dept: ENDOSCOPY | Age: 38
DRG: 380 | End: 2018-08-23
Payer: COMMERCIAL

## 2018-08-23 ENCOUNTER — ANESTHESIA EVENT (OUTPATIENT)
Dept: ENDOSCOPY | Age: 38
DRG: 380 | End: 2018-08-23
Payer: COMMERCIAL

## 2018-08-23 PROBLEM — K12.0 APHTHOUS ULCER: Status: ACTIVE | Noted: 2018-08-23

## 2018-08-23 PROBLEM — K13.21 LEUKOPLAKIA OF ORAL CAVITY: Status: ACTIVE | Noted: 2018-08-23

## 2018-08-23 LAB
ANION GAP SERPL CALC-SCNC: 4 MMOL/L (ref 5–15)
BUN SERPL-MCNC: 7 MG/DL (ref 6–20)
BUN/CREAT SERPL: 9 (ref 12–20)
CALCIUM SERPL-MCNC: 8.2 MG/DL (ref 8.5–10.1)
CHLORIDE SERPL-SCNC: 107 MMOL/L (ref 97–108)
CO2 SERPL-SCNC: 29 MMOL/L (ref 21–32)
CREAT SERPL-MCNC: 0.82 MG/DL (ref 0.7–1.3)
ERYTHROCYTE [DISTWIDTH] IN BLOOD BY AUTOMATED COUNT: 11.9 % (ref 11.5–14.5)
GLUCOSE SERPL-MCNC: 83 MG/DL (ref 65–100)
HCT VFR BLD AUTO: 37.3 % (ref 36.6–50.3)
HGB BLD-MCNC: 12.6 G/DL (ref 12.1–17)
HIV 1+2 AB+HIV1 P24 AG SERPL QL IA: NONREACTIVE
HIV12 RESULT COMMENT, HHIVC: NORMAL
MCH RBC QN AUTO: 29.1 PG (ref 26–34)
MCHC RBC AUTO-ENTMCNC: 33.8 G/DL (ref 30–36.5)
MCV RBC AUTO: 86.1 FL (ref 80–99)
NRBC # BLD: 0 K/UL (ref 0–0.01)
NRBC BLD-RTO: 0 PER 100 WBC
PLATELET # BLD AUTO: 321 K/UL (ref 150–400)
PMV BLD AUTO: 8.8 FL (ref 8.9–12.9)
POTASSIUM SERPL-SCNC: 4 MMOL/L (ref 3.5–5.1)
RBC # BLD AUTO: 4.33 M/UL (ref 4.1–5.7)
SODIUM SERPL-SCNC: 140 MMOL/L (ref 136–145)
WBC # BLD AUTO: 6.8 K/UL (ref 4.1–11.1)

## 2018-08-23 PROCEDURE — 0DB68ZX EXCISION OF STOMACH, VIA NATURAL OR ARTIFICIAL OPENING ENDOSCOPIC, DIAGNOSTIC: ICD-10-PCS | Performed by: INTERNAL MEDICINE

## 2018-08-23 PROCEDURE — 88305 TISSUE EXAM BY PATHOLOGIST: CPT | Performed by: INTERNAL MEDICINE

## 2018-08-23 PROCEDURE — 85027 COMPLETE CBC AUTOMATED: CPT | Performed by: INTERNAL MEDICINE

## 2018-08-23 PROCEDURE — 80048 BASIC METABOLIC PNL TOTAL CA: CPT | Performed by: INTERNAL MEDICINE

## 2018-08-23 PROCEDURE — 74011250636 HC RX REV CODE- 250/636: Performed by: INTERNAL MEDICINE

## 2018-08-23 PROCEDURE — 74011250637 HC RX REV CODE- 250/637: Performed by: INTERNAL MEDICINE

## 2018-08-23 PROCEDURE — 36415 COLL VENOUS BLD VENIPUNCTURE: CPT | Performed by: INTERNAL MEDICINE

## 2018-08-23 PROCEDURE — 76040000019: Performed by: INTERNAL MEDICINE

## 2018-08-23 PROCEDURE — 65270000029 HC RM PRIVATE

## 2018-08-23 PROCEDURE — 74011000250 HC RX REV CODE- 250: Performed by: INTERNAL MEDICINE

## 2018-08-23 PROCEDURE — 88342 IMHCHEM/IMCYTCHM 1ST ANTB: CPT | Performed by: INTERNAL MEDICINE

## 2018-08-23 PROCEDURE — 76060000031 HC ANESTHESIA FIRST 0.5 HR: Performed by: INTERNAL MEDICINE

## 2018-08-23 PROCEDURE — 74011250636 HC RX REV CODE- 250/636

## 2018-08-23 PROCEDURE — 77030009426 HC FCPS BIOP ENDOSC BSC -B: Performed by: INTERNAL MEDICINE

## 2018-08-23 RX ORDER — LIDOCAINE HYDROCHLORIDE 20 MG/ML
INJECTION, SOLUTION EPIDURAL; INFILTRATION; INTRACAUDAL; PERINEURAL AS NEEDED
Status: DISCONTINUED | OUTPATIENT
Start: 2018-08-23 | End: 2018-08-23 | Stop reason: HOSPADM

## 2018-08-23 RX ORDER — SODIUM CHLORIDE 9 MG/ML
INJECTION, SOLUTION INTRAVENOUS
Status: DISCONTINUED | OUTPATIENT
Start: 2018-08-23 | End: 2018-08-23 | Stop reason: HOSPADM

## 2018-08-23 RX ORDER — PROPOFOL 10 MG/ML
INJECTION, EMULSION INTRAVENOUS
Status: DISCONTINUED | OUTPATIENT
Start: 2018-08-23 | End: 2018-08-23 | Stop reason: HOSPADM

## 2018-08-23 RX ORDER — PROPOFOL 10 MG/ML
INJECTION, EMULSION INTRAVENOUS AS NEEDED
Status: DISCONTINUED | OUTPATIENT
Start: 2018-08-23 | End: 2018-08-23 | Stop reason: HOSPADM

## 2018-08-23 RX ORDER — ONDANSETRON 2 MG/ML
4 INJECTION INTRAMUSCULAR; INTRAVENOUS
Status: DISCONTINUED | OUTPATIENT
Start: 2018-08-23 | End: 2018-08-25 | Stop reason: HOSPADM

## 2018-08-23 RX ADMIN — LIDOCAINE HYDROCHLORIDE 5 ML: 20 SOLUTION ORAL; TOPICAL at 13:03

## 2018-08-23 RX ADMIN — LIDOCAINE HYDROCHLORIDE 5 ML: 20 SOLUTION ORAL; TOPICAL at 22:09

## 2018-08-23 RX ADMIN — Medication 10 ML: at 13:03

## 2018-08-23 RX ADMIN — PROPOFOL 50 MG: 10 INJECTION, EMULSION INTRAVENOUS at 12:05

## 2018-08-23 RX ADMIN — SODIUM CHLORIDE 125 ML/HR: 900 INJECTION, SOLUTION INTRAVENOUS at 00:51

## 2018-08-23 RX ADMIN — NYSTATIN 500000 UNITS: 100000 SUSPENSION ORAL at 13:02

## 2018-08-23 RX ADMIN — SODIUM CHLORIDE 125 ML/HR: 900 INJECTION, SOLUTION INTRAVENOUS at 09:15

## 2018-08-23 RX ADMIN — Medication 10 ML: at 00:52

## 2018-08-23 RX ADMIN — LIDOCAINE HYDROCHLORIDE 5 ML: 20 SOLUTION ORAL; TOPICAL at 06:33

## 2018-08-23 RX ADMIN — PROPOFOL 110 MCG/KG/MIN: 10 INJECTION, EMULSION INTRAVENOUS at 12:08

## 2018-08-23 RX ADMIN — MORPHINE SULFATE 2 MG: 4 INJECTION INTRAVENOUS at 10:54

## 2018-08-23 RX ADMIN — MORPHINE SULFATE 2 MG: 4 INJECTION INTRAVENOUS at 06:44

## 2018-08-23 RX ADMIN — NYSTATIN 500000 UNITS: 100000 SUSPENSION ORAL at 22:08

## 2018-08-23 RX ADMIN — LIDOCAINE HYDROCHLORIDE 5 ML: 20 SOLUTION ORAL; TOPICAL at 00:52

## 2018-08-23 RX ADMIN — LIDOCAINE HYDROCHLORIDE 5 ML: 20 SOLUTION ORAL; TOPICAL at 16:19

## 2018-08-23 RX ADMIN — SODIUM CHLORIDE: 9 INJECTION, SOLUTION INTRAVENOUS at 12:00

## 2018-08-23 RX ADMIN — ENOXAPARIN SODIUM 40 MG: 100 INJECTION SUBCUTANEOUS at 22:09

## 2018-08-23 RX ADMIN — SODIUM CHLORIDE 200 MG: 900 INJECTION, SOLUTION INTRAVENOUS at 16:16

## 2018-08-23 RX ADMIN — LIDOCAINE HYDROCHLORIDE 40 MG: 20 INJECTION, SOLUTION EPIDURAL; INFILTRATION; INTRACAUDAL; PERINEURAL at 12:00

## 2018-08-23 RX ADMIN — Medication 10 ML: at 22:10

## 2018-08-23 NOTE — PERIOP NOTES
Ruperto Summers  1980  [id number]    Situation:    Scheduled Procedure: Procedure(s):  ESOPHAGOGASTRODUODENOSCOPY (EGD)  Verbal report received from: Laure Shelton RN  Preoperative diagnosis: dysphagia    Background:    Procedure: Procedure(s):  ESOPHAGOGASTRODUODENOSCOPY (EGD)  Physician performing procedure; Dr. Vannesa Chang MD [facility]    SBAR QUESTIONS FLOOR TO ENDO RN    NPO Status/Last PO Intake: [place]    Pregnancy Test:Not applicable     Is the patient taking Blood Thinners: YES. Prophylactic: Lovenox 40mg   Is the patient diabetic:no      Does the patient have a Pacemaker/Defibrillator in place?: no   Does the patient need antibiotics before/during/after procedure: no   If the patient is having a colon, How much prep was drank? N/A   Is patient on CONTACT precautions:no      Assessment:  Are the vital signs stable prior to patient coming to ENDO?  yes  Is the patient alert/oriented and able to sign consent for the procedures:yes  How does the patient's abdomen feel prior to coming to ENDO? round and soft yes   Does the patient have a patient IV in place? YES     Recommendation:  Family or Friend present yes.  Wife    Permission to share finding with Family or Friend yes

## 2018-08-23 NOTE — ED NOTES
Pt transported upstairs with ED tech. Pt in no acute distress. Wife with pt. Pt A&O x 4. Pt reports his mouth feels better after receiving nystatin (see MAR).

## 2018-08-23 NOTE — PROCEDURES
Rosamaria Easley M.D.  [phone]           2018                EGD Operative Report  Ruperto Summers  :  1980  [facility] Medical Record Number:  [id number]      Indication: odynophagia     : Constantin Hendrickson MD    Referring Provider:  None      Anesthesia/Sedation:  MAC anesthesia Propofol    Airway assessment: No airway problems anticipated    Pre-Procedural Exam:      Airway: clear, no airway problems anticipated  Heart: RRR, without gallops or rubs  Lungs: clear bilaterally without wheezes, crackles, or rhonchi  Abdomen: soft, nontender, nondistended, bowel sounds present  Mental Status: awake, alert and oriented to person, place and time       Procedure Details     After infomed consent was obtained for the procedure, with all risks and benefits of procedure explained the patient was taken to the endoscopy suite and placed in the left lateral decubitus position. Following sequential administration of sedation as per above, the endoscope was inserted into the mouth and advanced under direct vision to second portion of the duodenum. A careful inspection was made as the gastroscope was withdrawn, including a retroflexed view of the proximal stomach; findings and interventions are described below. Findings:   Oral cavity - there was evidence of oral candidiasis noted on exam.  Esophagus:normal  Stomach: normal   Duodenum/jejunum: normal    Therapies:  biopsy of stomach to rule out H.pylori infection    Specimens: as above           Complications:   None; patient tolerated the procedure well. EBL:  None. Impression:    There was evidence of oral candidiasis noted on examination. Also aphthous ulcers noted on lips                The rest of the exam was negative for presence of esophageal candidiasis. Gastric biopsies were obtained to confirm eradication of H.pylori infection    Recommendations:    -Continue acid suppression. ,   -Await pathology. ,   -Continue Nystatin S/S and magic mouth wash for relief   -Consult Infectious disease for further work up of scalp lesions and oral ulcers - rule out HIV etc  -Will see again on request.   -Case d/w Dr. Toro Christianson MD

## 2018-08-23 NOTE — ED PROVIDER NOTES
HPI Comments: 45 y.o. male with no significant past medical history who presents to the ED with chief complaint of skin lesions. Pt reports he started with throat pain about 2 months ago. Pt states he was seen at [facility] and dx with thrush at that time and was treated with unknown pills for one week. Pt states a few weeks later on 7/31 he was seen by Dr. Rosette Corado and was prescribed Diflucan. Pt states 8/2 he was seen by Dr. Karan Deal, states he was scoped and was prescribed on amoxicillin, clarithromycin, and omeprazole at that time (which he just completed). Pt states he was then seen at [facility] ED on 8/7, dx with oral thrush, and was switched to itraconazole. Pt reports for the past 10 days he has had increased sores in his mouth as well as lesions on his scalp, face, and torso accompanied by continued weight loss (has lost 16 lbs in two months), anorexia, and nausea. Pt states his sx have gotten worse every day and he is now having to blend food in order to eat due to difficulty swallowing. Pt states he was previously otherwise healthy and active in recreational sports. Pt denies any recent travels outside of the [place]. Pt denies any genital sx. There are no other acute medical complaints voiced at this time. Social Hx: Never smoker. Denies EtOH or drug use. Sexually active with one partner (his wife). 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 PCP: None  GI: Dr. Karan Deal    Note written by Demetrice Cantu, as dictated by ESPERANZA Marshall 8:05 PM     The history is provided by the patient and a relative (son). The history is limited by a language barrier ([place]). A  was used (son). No past medical history on file. No past surgical history on file. No family history on file.     Social History     Social History    Marital status:      Spouse name: N/A    Number of children: N/A    Years of education: N/A     Occupational History    Not on file.     Social History Main Topics    Smoking status: Never Smoker    Smokeless tobacco: Never Used    Alcohol use No    Drug use: No    Sexual activity: Not on file     Other Topics Concern    Not on file     Social History Narrative         ALLERGIES: Review of patient's allergies indicates no known allergies. Review of Systems   Constitutional: Positive for appetite change (decreased) and unexpected weight change (lost about 16 lbs in 2 months). HENT: Positive for mouth sores, sore throat and trouble swallowing. Gastrointestinal: Positive for nausea. Genitourinary: Negative for genital sores. Skin:        +lesions on scalp, face, and torso   All other systems reviewed and are negative. Vitals:    08/22/18 1943   BP: 114/72   Pulse: 83   Resp: 18   Temp: 98 °F (36.7 °C)   SpO2: 99%   Weight: 57.6 kg (126 lb 15.8 oz)   Height: 5' 6\" (1.676 m)            Physical Exam   Constitutional: He is oriented to person, place, and time. He appears well-developed and well-nourished. He is active. Non-toxic appearance. No distress. HENT:   Head: Normocephalic and atraumatic. Mouth/Throat:       Eyes: Conjunctivae are normal. Pupils are equal, round, and reactive to light. Right eye exhibits no discharge. Left eye exhibits no discharge. Neck: Normal range of motion and full passive range of motion without pain. Neck supple. No tracheal tenderness present. Cardiovascular: Normal rate, regular rhythm, normal heart sounds, intact distal pulses and normal pulses. Exam reveals no gallop and no friction rub. No murmur heard. Pulmonary/Chest: Effort normal and breath sounds normal. No respiratory distress. He has no wheezes. He has no rales. He exhibits no tenderness. Abdominal: Soft. Bowel sounds are normal. He exhibits no distension. There is no tenderness. There is no rebound and no guarding. Musculoskeletal: Normal range of motion. He exhibits no edema or tenderness.    Neurological: He is alert and oriented to person, place, and time. He has normal strength. No cranial nerve deficit or sensory deficit. Coordination normal.   Skin: Skin is warm, dry and intact. Rash noted. No abrasion noted. He is not diaphoretic. No erythema. Raised thick lesions to scalp and lips; oval-shaped lesions to torso, behind left ear and face   Psychiatric: He has a normal mood and affect. His speech is normal and behavior is normal. Cognition and memory are normal.   Nursing note and vitals reviewed. MDM  Number of Diagnoses or Management Options  Diagnosis management comments:   Ddx: HIV, immunosuppresion, oral thrush       Amount and/or Complexity of Data Reviewed  Clinical lab tests: ordered and reviewed  Review and summarize past medical records: yes  Discuss the patient with other providers: yes    Patient Progress  Patient progress: stable        ED Course       Procedures    Patient is a 45yo previously healthy male with an unintentional documented 11lb. weight loss since 7/6/18, with oral thrush not improved with diflucan or itraconazole. Now with increasing fatigue, now with oral lesions, rash to face, torso, oral mucosa; not able to tolerate solid foods, only liquids and appears pale. Concern for HIV or other autoimmune problem. He has been seen by at least 4 different providers for this problem ([facility], [facility], Dr. Ian Erickson), now here, and states his condition is worsening. He has no follow-up in the community. Requested admission for further management. Carlee Tolbert PA-C      CONSULT NOTE:   9:28 PM  Carlee Tolbert PA-C spoke with Dr. Kerwin Damico,   Specialty: Hospitalist  Discussed pt's hx, disposition, and available diagnostic and imaging results. Reviewed care plans. Consultant agrees with plans as outlined. He will see and evaluate patient for admission. Written by Carlee Tolbert PA-C.       Abby Katheryn tells me patient has a SAD score of 4 because a few days ago he felt \"like not living anymore\". I talked with patient who states he was upset yesterday because he has not been able to tolerate solid foods for 2-3 weeks, and the past 2 days has been the worst of his symptoms to where he states \"I don't want to keep living like this\" but states he is not suicidal or homicidal, has no plan, loves with wife and son and has no desire to end his own life, just upset because of his situation and how his health is declining.    Cari Velasquez PA-C

## 2018-08-23 NOTE — PROGRESS NOTES
Bedside and Verbal shift change report given to [address] [place] [address] (oncoming nurse) by Ike Momin RN (offgoing nurse). Report included the following information SBAR, Kardex, MAR and Recent Results.

## 2018-08-23 NOTE — ANESTHESIA PREPROCEDURE EVALUATION
Anesthetic History   No history of anesthetic complications            Review of Systems / Medical History  Patient summary reviewed, nursing notes reviewed and pertinent labs reviewed    Pulmonary  Within defined limits                 Neuro/Psych   Within defined limits           Cardiovascular  Within defined limits                Exercise tolerance: >4 METS     GI/Hepatic/Renal  Within defined limits             Comments: odynophagia Endo/Other  Within defined limits           Other Findings              Physical Exam    Airway  Mallampati: II  TM Distance: 4 - 6 cm  Neck ROM: normal range of motion   Mouth opening: Normal     Cardiovascular    Rhythm: regular  Rate: normal         Dental    Dentition: Lower dentition intact and Upper dentition intact     Pulmonary  Breath sounds clear to auscultation               Abdominal         Other Findings            Anesthetic Plan    ASA: 1  Anesthesia type: MAC          Induction: Intravenous  Anesthetic plan and risks discussed with: Patient

## 2018-08-23 NOTE — PROGRESS NOTES
Spoke with patient and wife. SoraidaOxigene  phone at patient's bedside and working. Both patient and wife expressed understanding. They also stated that they hope his condition will be resolved. Patient stated that he has always lead a healthy lifestyle with exercise and good nutrition. Will f/u with patient.       Milagro Schwartz Certified

## 2018-08-23 NOTE — PROGRESS NOTES
8/23/2018  11:07 AM  Reason for Admission:   Odynaophagia                   RRAT Score:   2                  Plan for utilizing home health:      TBD                    Likelihood of Readmission:  Green                         Transition of Care Plan:               CM met with pt for assessment. Demographics and PCP were confirmed. Pt is a 45year old,  male who lives in a private residence with his wife and 2 children (3 exterior steps, 0 interior steps). PTA, pt was able to complete ADLs without the use of DME. Pt has prescription drug coverage, and prefers [address] on [facility]. Pt has no prior hh, rehab or SNF. Awaiting results and ID consultation. Tere Jones MA    Care Management Interventions  PCP Verified by CM: Yes (None)  Palliative Care Criteria Met (RRAT>21 & CHF Dx)?: No  Mode of Transport at Discharge:  Other (see comment) (Wife)  [place] Signup: No  Discharge Durable Medical Equipment: No  Physical Therapy Consult: No  Occupational Therapy Consult: No  Speech Therapy Consult: No  Current Support Network: Lives with Spouse  Confirm Follow Up Transport: Family  Plan discussed with Pt/Family/Caregiver: Yes  Discharge Location  Discharge Placement: Unable to determine at this time

## 2018-08-23 NOTE — ANESTHESIA POSTPROCEDURE EVALUATION
Post-Anesthesia Evaluation and Assessment    Patient: Mateo Daugherty MRN: [id number]  SSN: [id number]    YOB: 1980  Age: 45 y.o. Sex: male       Cardiovascular Function/Vital Signs  Visit Vitals    /69    Pulse 84    Temp 36.2 °C (97.2 °F)    Resp 13    Ht 5' 6\" (1.676 m)    Wt 57.6 kg (126 lb 15.8 oz)    SpO2 100%    BMI 20.5 kg/m2       Patient is status post MAC anesthesia for Procedure(s):  ESOPHAGOGASTRODUODENOSCOPY (EGD)  ESOPHAGOGASTRODUODENAL (EGD) BIOPSY. Nausea/Vomiting: None    Postoperative hydration reviewed and adequate. Pain:  Pain Scale 1: Numeric (0 - 10) (08/23/18 1234)  Pain Intensity 1: 0 (08/23/18 1234)   Managed    Neurological Status:   Neuro  Neurologic State: Appropriate for age; Alert (08/23/18 8861)  Orientation Level: Appropriate for age;Oriented X4 (08/23/18 0051)  Cognition: Appropriate decision making; Appropriate for age attention/concentration; Appropriate safety awareness; Follows commands (08/23/18 0051)  Speech: Clear (08/23/18 0051)  LUE Motor Response: Purposeful (08/23/18 0051)  LLE Motor Response: Purposeful (08/23/18 0051)  RUE Motor Response: Purposeful (08/23/18 0051)  RLE Motor Response: Purposeful (08/23/18 0051)   At baseline    Mental Status and Level of Consciousness: Arousable    Pulmonary Status:   O2 Device: Room air (08/23/18 1234)   Adequate oxygenation and airway patent    Complications related to anesthesia: None    Post-anesthesia assessment completed.  No concerns    Signed By: Manjeet Rios MD     August 23, 2018

## 2018-08-23 NOTE — CONSULTS
[address] [place] [address]. [address] [place] [address] NP  [phone]                    GASTROENTEROLOGY CONSULTATION NOTE              NAME:  Ruperto Forrest   :   1980   MRN:   [id number]       Referring Physician:   Dr. Ghazal Mcfarlane Date:   2018 10:41 AM    Chief Complaint:    Odynophagia     History of Present Illness:    Patient is a 45 y.o. who male who we were asked to see in consultation for odynophagia. The odynophagia started 3 weeks ago. He has been unable to eat anything and has lost 16 lbs recently. He had an EGD on 18 which showed gastritis, a clean based ulcer in the esophagus, and was positive for H pylori. He has been taking antibiotic therapy as prescribed. He denies nausea and vomiting. PMH:  No past medical history on file. PSH:  No past surgical history on file.     Allergies:  No Known Allergies    Home Medications:  None       Hospital Medications:  Current Facility-Administered Medications   Medication Dose Route Frequency    magic mouthwash cpd (without sucralfate)  5 mL Oral AC&HS    ondansetron (ZOFRAN) injection 4 mg  4 mg IntraVENous Q4H PRN    sodium chloride (NS) flush 5-10 mL  5-10 mL IntraVENous Q8H    sodium chloride (NS) flush 5-10 mL  5-10 mL IntraVENous PRN    enoxaparin (LOVENOX) injection 40 mg  40 mg SubCUTAneous Q24H    fluconazole (DIFLUCAN) 200mg/100 mL IVPB (premix)  200 mg IntraVENous DAILY    nystatin (MYCOSTATIN) 100,000 unit/mL oral suspension 500,000 Units  500,000 Units Oral QID    ketoconazole (NIZORAL) 2 % shampoo   Topical DAILY    0.9% sodium chloride infusion  125 mL/hr IntraVENous CONTINUOUS    morphine injection 2 mg  2 mg IntraVENous Q4H PRN       Social History:  Social History   Substance Use Topics    Smoking status: Never Smoker    Smokeless tobacco: Never Used    Alcohol use No       Family History:  Family History   Problem Relation Age of Onset    Family history unknown: Yes       Review of Systems:  Constitutional: negative fever, negative chills, negative weight loss  Eyes:   negative visual changes  ENT:   Positive sore throat, no tongue or lip swelling  Respiratory:  negative cough, negative dyspnea  Cards:  negative for chest pain, palpitations, lower extremity edema  GI:   See HPI  :  negative for frequency, dysuria  Integument:  Positive for rash  Heme:  negative for easy bruising and gum/nose bleeding  Musculoskel: negative for myalgias,  back pain and muscle weakness  Neuro: negative for headaches, dizziness, vertigo  Psych:  negative for feelings of anxiety, depression     Objective:   Patient Vitals for the past 8 hrs:   BP Temp Pulse Resp SpO2   08/23/18 0729 116/72 98.4 °F (36.9 °C) 68 20 100 %   08/23/18 0316 125/75 97.9 °F (36.6 °C) 69 16 100 %             EXAM:     NEURO-alert, oriented x3, affect appropriate   HEENT-Head: Normocephalic, Plaques appearing similar to thrush noted on tongue. Scabs on lips. LUNGS-clear to auscultation bilaterally    COR-regular rate and rhythym     ABD- soft, non-tender. Bowel sounds normal. No masses,  no organomegaly     EXT-no edema    Skin - Rash noted     Data Review     Recent Labs      08/23/18   0343  08/22/18 1957   WBC  6.8  8.8   HGB  12.6  14.5   HCT  37.3  43.3   PLT  321  356     Recent Labs      08/23/18   0343  08/22/18 1957   NA  140  137   K  4.0  3.7   CL  107  101   CO2  29  30   BUN  7  10   CREA  0.82  0.86   GLU  83  81   CA  8.2*  8.3*     Recent Labs      08/22/18 1957   SGOT  21   AP  70   TP  7.0   ALB  3.3*   GLOB  3.7     No results for input(s): INR, PTP, APTT in the last 72 hours.     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 - Continue magic mouthwash, nystatin and fluconazole. - Diet recommendations to follow procedure. Will continue to follow. Thanks for allowing me to participate in the care of this patient.   Signed By: Esteban Romero NP     8/23/2018  10:41 AM

## 2018-08-23 NOTE — PROGRESS NOTES
Ruperto Kwan  1980  [id number]    Situation:  Verbal report received from:   Javier Lozano RN  Procedure: Procedure(s):  ESOPHAGOGASTRODUODENOSCOPY (EGD)  ESOPHAGOGASTRODUODENAL (EGD) BIOPSY    Background:    Preoperative diagnosis: dysphagia  Postoperative diagnosis: * No post-op diagnosis entered *    :  Dr. Aram Carranza  Assistant(s): Endoscopy Technician-1: Kayden De Los Santos  Endoscopy RN-1: Kenny Thomas RN    Specimens:   ID Type Source Tests Collected by Time Destination   1 : Biopsy Gastric [id number] Jose Calloway MD 8/23/2018 1212 Pathology     H. Pylori  no    Assessment:  Intra-procedure medications      Anesthesia gave intra-procedure sedation and medications, see anesthesia flow sheet yes    Intravenous fluids: NS@ KVO     Vital signs stable   yes    Abdominal assessment: round and soft   yes    Recommendation:  Discharge patient per MD order  inpatient.   Return to floor  yes  Family or Friend   spouse  Permission to share finding with family or friend yes

## 2018-08-23 NOTE — PERIOP NOTES
TRANSFER - OUT REPORT:    Verbal report given to [address] [place] [address] on Elder Daniel Nascimento  being transferred to 4th Floor for routine progression of care       Report consisted of patients Situation, Background, Assessment and   Recommendations(SBAR). Information from the following report(s) Procedure Summary, Intake/Output, Recent Results and Cardiac Rhythm NSR was reviewed with the receiving nurse. Lines:   Peripheral IV 08/22/18 Left Antecubital (Active)   Site Assessment Clean, dry, & intact 8/23/2018 11:29 AM   Phlebitis Assessment 0 8/23/2018 11:29 AM   Infiltration Assessment 0 8/23/2018 11:29 AM   Dressing Status Clean, dry, & intact 8/23/2018 11:29 AM   Dressing Type Tape;Transparent 8/23/2018 11:29 AM   Hub Color/Line Status Pink 8/23/2018 11:29 AM   Action Taken Open ports on tubing capped 8/23/2018 11:29 AM   Alcohol Cap Used Yes 8/23/2018 11:29 AM        Opportunity for questions and clarification was provided.       Patient transported with:   [employer]

## 2018-08-23 NOTE — ED NOTES
TRANSFER - OUT REPORT:    Verbal report given to Riley Valera  being transferred to 403(unit) for routine progression of care       Report consisted of patients Situation, Background, Assessment and   Recommendations(SBAR). Information from the following report(s) SBAR, ED Summary, [facility] and Recent Results was reviewed with the receiving nurse. Lines:   Peripheral IV 08/22/18 Left Antecubital (Active)   Site Assessment Clean, dry, & intact 8/22/2018  7:55 PM   Phlebitis Assessment 0 8/22/2018  7:55 PM   Infiltration Assessment 0 8/22/2018  7:55 PM   Dressing Type Transparent 8/22/2018  7:55 PM   Hub Color/Line Status Patent; Flushed;Pink 8/22/2018  7:55 PM   Action Taken Blood drawn 8/22/2018  7:55 PM        Opportunity for questions and clarification was provided.       Patient transported with:   [employer]

## 2018-08-23 NOTE — PROGRESS NOTES
Medical Progress Note      NAME: Freddy Osborne   :  1980  MRM:  [id number]    Date/Time: 2018  8:52 AM       Assessment / Plan:     Odynophagia / Oral lesions:  Aphthous ulcers and thrush. -- continue magic mouth wash but increase frequency   -- consult GI    -- morphine IV     Thrush (2018) / Weight loss (2018): Weight loss may be from poor po intake. Angela Ghosh has not responded to outpatient treatment. -- await HIV test   -- continue fluconazole IV   -- continue magic mouthwash and nystatin     Tinea Capitis with kerion:  No sig erythema noted to suspect bacterial infection or folliculitis. -- continue fluconazole    **3:11 PM  Discussed with ID. Given the lack of improvement with outpatient treatment and unclear diagnosis here, recommend higher level of care and dermatology evaluation +/- biopsy. [facility] called but they are on restriction, but will await call back from [facility] MD.    **3:34 PM  Discussed with Dr. Surinder Pham (sp?) at [facility]. Accepted for transfer but there will be a delay due to bed availability. In the meantime, will ask gen surg to consider skin bx particularly of sternal lesion. Patient also denies SI/HI. Reports frustration over the lack of improvement in oral and skin lesions making it difficult to eat and drink. Did at one point express to his wife, that he \"can't live like this. \"  But he states he \"loves his life, his wife, and his son\" and would not consider hurting himself. Will dc suicide precautions. Subjective:     Chief Complaint:  Mouth pain. Still has pains in throat and mouth. No fever. ROS:  (bold if positive, if negative)              Objective:       Vitals:          Last 24hrs VS reviewed since prior progress note.  Most recent are:    Visit Vitals    /72 (BP 1 Location: Right arm, BP Patient Position: Supine)    Pulse 68    Temp 98.4 °F (36.9 °C)    Resp 20    Ht 5' 6\" (1.676 m)    Wt 57.6 kg (126 lb 15.8 oz)    SpO2 100%    BMI 20.5 kg/m2     SpO2 Readings from Last 6 Encounters:   08/23/18 100%   08/07/18 99%   07/06/18 99%        No intake or output data in the 24 hours ending 08/23/18 7249       Exam:     Physical Exam:    Gen:  Well-developed, well-nourished, in no acute distress  HEENT:  Pink conjunctivae, hearing intact to voice, moist mucous membranes with ulcers and erythema, scaly yellow crusted lesions of scalp w/o alopecia  Resp:  No accessory muscle use, clear breath sounds without wheezes rales or rhonchi  Card:  No murmurs, normal S1, S2 without thrills or peripheral edema  Abd:  Soft, non-tender, non-distended, normoactive bowel sounds are present  Skin:  **re-eval of trunk shows central discoid hyperpigmented macular lesion above sternum and 3 smaller similar lesions on back with crust.  Small rounded macule on nose. Neuro:   Face symmetric, tongue midline, speech fluent,  strength is 5/5 bilaterally and dorsi / plantarflexion is 5/5 bilaterally, follows commands appropriately  Psych:  Good insight, oriented to person, place and time, alert       Medications Reviewed: (see below)    Lab Data Reviewed: (see below)    ______________________________________________________________________    Medications:     Current Facility-Administered Medications   Medication Dose Route Frequency    magic mouthwash cpd (without sucralfate)  5 mL Oral AC&HS    sodium chloride (NS) flush 5-10 mL  5-10 mL IntraVENous Q8H    sodium chloride (NS) flush 5-10 mL  5-10 mL IntraVENous PRN    enoxaparin (LOVENOX) injection 40 mg  40 mg SubCUTAneous Q24H    fluconazole (DIFLUCAN) 200mg/100 mL IVPB (premix)  200 mg IntraVENous DAILY    nystatin (MYCOSTATIN) 100,000 unit/mL oral suspension 500,000 Units  500,000 Units Oral QID    ketoconazole (NIZORAL) 2 % shampoo   Topical DAILY    0.9% sodium chloride infusion  125 mL/hr IntraVENous CONTINUOUS    morphine injection 2 mg  2 mg IntraVENous Q4H PRN            Lab Review: Recent Labs      08/23/18 0343  08/22/18 1957   WBC  6.8  8.8   HGB  12.6  14.5   HCT  37.3  43.3   PLT  321  356     Recent Labs      08/23/18 0343  08/22/18 1957   NA  140  137   K  4.0  3.7   CL  107  101   CO2  29  30   GLU  83  81   BUN  7  10   CREA  0.82  0.86   CA  8.2*  8.3*   ALB   --   3.3*   TBILI   --   0.2   SGOT   --   21   ALT   --   29     No results found for: GLUCPOC      Total time spent with patient: 30 300 Magdi Rodas discussed with: Patient    Discussed:  Care Plan    Prophylaxis:  Lovenox    Disposition:  Home w/Family           ___________________________________________________    Attending Physician: Jarvis Johns MD

## 2018-08-23 NOTE — CONSULTS
[address]  MR#: [id number]  : 1980  ACCOUNT #: [de-identified]   DATE OF SERVICE: 2018    CHIEF COMPLAINT:  Odynophagia and weight loss. HISTORY OF PRESENT ILLNESS:  The patient is a 51-year-old male with no significant past medical history, who was admitted to [facility] on  with the aforementioned complaint. He is from [place], but has been in the [place] for over 20 years. He has not left this country for many years. He was in his usual state of health up until July of this year, when he developed throat pain. He was seen in the Emergency Department on , at which time the workup was unrevealing and he was advised to follow up with GI. He was subsequently seen at [facility] on 2 different occasions for thrush reportedly ongoing for approximately 1 month. He was treated with lidocaine, Magic mouthwash and fluconazole with no improvement. He was eventually seen by GI and underwent EGD. He was diagnosed with Helicobacter pylori and completed an antibiotic regimen for that. He returned to the Emergency Department at [facility] on , at which time the fluconazole was changed to itraconazole. Again, there was no improvement. In the interim, he has experienced progressive weight loss due to inability to eat. There has been no resolution of the thrush. At the time of admission, he has developed some ulcerative lesions on his scalp, neck, chest and back. He remains afebrile with a normal white count. He is currently on fluconazole and topical ketoconazole shampoo. The infectious diseases service has been asked to assist with antibiotic management. PAST MEDICAL HISTORY AND PAST SURGICAL HISTORY:  Negative. SOCIAL HISTORY:  No alcohol, tobacco or illicit drug use. He is from [place], but has been in the [place] for over 20 years. He has not left the country recently.   He denies any unpasteurized food intake. No sick contacts. He works in a [employer] but has been out of work for approximately 3 months. No sick contacts. He is  with a 8year-old child. ALLERGIES:  NO KNOWN DRUG ALLERGIES. REVIEW OF SYSTEMS:  As per the HPI. Remainder of 12-system review of systems is unremarkable. LABORATORY DATA:  White blood cell count is 6800. Urinalysis is bland. Creatinine is 0.8. Blood cultures reveal no growth to date. HIV negative. PHYSICAL EXAMINATION:  VITAL SIGNS:  Temperature 97.2, maximum temperature is less than 100. Heart rate 84 beats per minute, blood pressure 113/69, oxygen saturation 100% on room air. GENERAL:  Alert, in no acute distress. HEENT:  There are some ulcerative lesions on the scalp. There is oral thrush with significant sloughing with scabbing of the lips. SKIN:  There are some dime-sized ulcerative lesions on the back, trunk and neck. ASSESSMENT AND PLAN:  Multiple oral and skin lesions with thrush. Etiology is unclear. The recent appearance of the skin lesions raises concern for a systemic process; however, this also may be a reaction to the multiple antibiotics the patient has been on. We will stop all current antibiotics, start the patient on anidulafungin for possible resistant Candida species causing thrush, such as Candida glabrata. Consider transfer to [facility] for further dermatological evaluation. Consider general surgery consult for biopsy as well as ENT consultation for further suggestions if transfer is not possible. Patient's HIV test is negative. We will check his CD4 count. Doubt systemic infectious process. The Infectious Diseases Service will continue to follow. Further recommendations pending results of the above. Thank you for allowing me to participate in care of this patient.       DO KEELEY Almendarez / SUHA  D: 08/23/2018 15:11     T: 08/23/2018 15:36  JOB #: [id number]

## 2018-08-23 NOTE — H&P
[facility]  [address] [place] [address], [place] [address]  [phone]    Admission History and Physical      NAME:  Elder Ange Sheldon   :   1980   MRN:  [id number]     PCP:  None     Date/Time:  2018         Subjective:     CHIEF COMPLAINT: weigh loss, odynophagia      HISTORY OF PRESENT ILLNESS:     Mr. Ange Sheldon is a 45 y.o.  male who is admitted with odynophagia. Mr. Ange Sheldon without significant PMH presented to ER c/o odynophagia, weight lose and skin rash. It started about 3 weeks ago and worse progressively. Unable to eat. He lost about 16 lb in 3 weeks. He was seen by GI and had EGD and treated with amoxicillin, clarithromycin, and omeprazole, which he has finished. [facility] and dx with [facility]ush at that time and was treated with unknown pills for one week. Seen by Dr Lamar Shelby and was treated with fluconazole. Pt states he was then seen at [facility] ED on , dx with oral thrush, and was switched to itraconazole. Pt has difficulty swallowing liquids and solids as well. Recently developed white dry lesions on his scalp. Pt denies PMH,  has 2 children. Denies unprotected sex. No past medical history on file. No past surgical history on file.     Social History   Substance Use Topics    Smoking status: Never Smoker    Smokeless tobacco: Never Used    Alcohol use No        Family History   Problem Relation Age of Onset    Family history unknown: Yes        No Known Allergies     Prior to Admission medications    Not on File         Review of Systems:  (bold if positive, if negative)    Gen:  Eyes:  ENT:  CVS:  Pulm:  GI:    :    MS:  Skin:  RashPsych:  Endo:    Hem:  Renal:    Neuro:            Objective:      VITALS:    Vital signs reviewed; most recent are:    Visit Vitals    /82    Pulse 83    Temp 98 °F (36.7 °C)    Resp 18    Ht 5' 6\" (1.676 m)    Wt 57.6 kg (126 lb 15.8 oz)  SpO2 100%    BMI 20.5 kg/m2     SpO2 Readings from Last 6 Encounters:   08/22/18 100%   08/07/18 99%   07/06/18 99%        No intake or output data in the 24 hours ending 08/22/18 2207         Exam:     Physical Exam:    Gen:  Well-developed, well-nourished, in no acute distress  HEENT:  Pink conjunctivae, PERRL, hearing intact to voice, white lesions on mucosal membrane. Neck:  Supple, without masses, thyroid non-tender  Resp:  No accessory muscle use, clear breath sounds without wheezes rales or rhonchi  Card:  No murmurs, normal S1, S2 without thrills, bruits or peripheral edema  Abd:  Soft, non-tender, non-distended, normoactive bowel sounds are present, no palpable organomegaly and no detectable hernias  Lymph:  No cervical or inguinal adenopathy  Musc:  No cyanosis or clubbing  Skin:  Dry, white lesion on the scalp. Neuro:  Cranial nerves are grossly intact, no focal motor weakness, follows commands appropriately  Psych:  Good insight, oriented to person, place and time, alert       Labs:    Recent Labs      08/22/18 1957   WBC  8.8   HGB  14.5   HCT  43.3   PLT  356     Recent Labs      08/22/18 1957   NA  137   K  3.7   CL  101   CO2  30   GLU  81   BUN  10   CREA  0.86   CA  8.3*   ALB  3.3*   TBILI  0.2   SGOT  21   ALT  29     No results found for: GLUCPOC  No results for input(s): PH, PCO2, PO2, HCO3, FIO2 in the last 72 hours. No results for input(s): INR in the last 72 hours. No lab exists for component: INREXT    Telemetry reviewed:          Assessment/Plan:    1. Odynophagia (8/22/2018). Secondary to aphthous ulcer vs thrush. Keep NPO, IVF, continue magic mouth wash. Consult GI. May need endoscopic evaluation. 2.  Thrush (8/22/2018)/ hairy leukoplakia/ aphthous ulcer/ Skin lesion (8/22/2018) / tinea capitis/ Weight loss (8/22/2018)/. Although pt denies unprotected sex or multiple sexual partners, my suspicion for HIV is high and will check HIV test ( pt has consented).  Started on iv fluconazole, nystatin swish and swallow, ketoconazole shampoo. Consult ID.      3.  Fatigue (8/22/2018). Supportive care.          Previous medical records reviewed     Risk of deterioration: high      Total time spent with patient: 79 [facility] discussed with: Patient, Family, Nursing Staff and >50% of time spent in counseling and coordination of care    Discussed:  Care Plan    Prophylaxis:  Lovenox    Probable Disposition:  Home w/Family           ___________________________________________________    Attending Physician: Liyah Aviles MD

## 2018-08-23 NOTE — PROGRESS NOTES
Bedside and Verbal shift change report given to Topher Quinonez RN (oncoming nurse) by Deion Saunders RN (offgoing nurse). Report included the following information SBAR, Kardex, MAR and Recent Results.

## 2018-08-23 NOTE — ED NOTES
Admission Time Out    Check signed & held orders:  [x] Yes or  [] No     Assess Vital Signs over the last 2 hours:  [x] Stable or  [] Unstable     Patient Vitals for the past 4 hrs:   Resp BP SpO2   08/22/18 2315 - 123/82 100 %   08/22/18 2311 - 123/72 99 %   08/22/18 2230 16 143/77 98 %   08/22/18 2215 - 128/79 100 %   08/22/18 2145 - 137/82 100 %   08/22/18 2138 - 124/74 100 %   08/22/18 2125 - - 100 %   08/22/18 2115 - 131/77 100 %   08/22/18 2100 - 127/71 100 %   08/22/18 2047 - 130/74 99 %   08/22/18 2030 - 129/84 100 %       Does this patient meet Code Purple criteria or other Core Measure protocol? [] Yes or  [x] No or  [] Already being treated     Unit patient assigned to: 403                   Does the patient need any special isolation? []  Yes or  [x] No    Is the assigned unit appropriate? [x] Yes or  [] No    Does the patient need to be transported on a monitor and/or has critical drips? [] Yes or  [x] No or  [] Order obtained to travel without Monitor/nurse    Any needs/concerns that need to be addressed prior to admission? (i.e. ED/Admit provider or Nursing Supervisor)      [] Yes or  [x] No    Does patient require IV fluid continued on admission?                                [x] Yes or  [] No  (Doxycycline-see MAR)      Fam Middleton RN

## 2018-08-23 NOTE — PROGRESS NOTES
Spiritual Care Assessment/Progress Note  [address]      NAME: Ruperto Chamorro      MRN: [id number]  AGE: 45 y.o. SEX: male  Anabaptism Affiliation: Tenriism   Language: English     8/23/2018     Total Time (in minutes): 15     Spiritual Assessment begun in [facility] POST SURG ORT 1 through conversation with:         [x]Patient        [x] Family    [] Friend(s)        Reason for Consult: Initial/Spiritual assessment, patient floor     Spiritual beliefs: (Please include comment if needed)     [x] Identifies with a max tradition:    Adventist     [] Supported by a max community:            [] Claims no spiritual orientation:           [] Seeking spiritual identity:                [] Adheres to an individual form of spirituality:           [] Not able to assess:                           Identified resources for coping:      [] Prayer                               [] Music                  [] Guided Imagery     [x] Family/friends                 [] Pet visits     [] Devotional reading                         [] Unknown     [] Other:                                           Interventions offered during this visit: (See comments for more details)    Patient Interventions: Affirmation of emotions/emotional suffering, Affirmation of max, Catharsis/review of pertinent events in supportive environment, Iconic (affirming the presence of God/Higher Power), Initial/Spiritual assessment, patient floor, Prayer (assurance of), Normalization of emotional/spiritual concerns     Family/Friend(s):  Affirmation of emotions/emotional suffering, Affirmation of max, Iconic (affirming the presence of God/Higher Power), Prayer (assurance of)     Plan of Care:     [] Support spiritual and/or cultural needs    [] Support AMD and/or advance care planning process      [] Support grieving process   [] Coordinate Rites and/or Rituals    [] Coordination with community clergy   [] No spiritual needs identified at this time   [] Detailed Plan of Care below (See Comments)  [] Make referral to Music Therapy  [] Make referral to Pet Therapy     [] Make referral to Addiction services  [] Make referral to [facility]  [] Make referral to Spiritual Care Partner  [] No future visits requested        [x] Follow up visits as needed     Comments: Initial spiritual assessment in [facility] Post [facility]. Mr. Keira Beavers shared his frustrations with his medical condition. He lamented how he had been trying everything he should and things just keep getting worse. Doctor came in during visit. Provided spiritual presence as they shared. Afterwards Mr. Keira Beavers shared his [facility] in [facility]. His wife came in towards the end of the visit. Provided spiritual words of comfort and assurance of prayer.     Visited by: Robb Hernández [address] [facility] Anusha Schuler ([id number])

## 2018-08-23 NOTE — ED NOTES
Assumed care of pt from triage. Pt presents to ED with chief complaint of lesions on body, fatigued, thrush in mouth and weight loss. Pt reports it has been hard for him to eat due to the lesions on lips and thrush in mouth from the pain. Pt is A&O x 4. Pt denies any other symptoms at this time. Pt resting comfortably on the stretcher in a position of comfort. Pt in no acute distress at this time. Call bell within reach. Side rails x 2. Cardiac monitor x 2. Stretcher locked in the lowest position. Pt aware of plan to await for MD/PA-C/NP assessment, and pt/family verbalizes understanding. Will continue to monitor.

## 2018-08-23 NOTE — PROGRESS NOTES
[facility]: MED RECONCILIATION    Comments/Recommendations:    Patient is currently not taking any OTC or Rx medications. Patient had empty medications bottles to review his most recent medications.  Patient completed the following medications yesterday:       -omeprazole 20mg BID X 14 days       -clarithromycin 500mg BID X 14 days       -Amoxicillin 500mg BID X 28 days    Patient was also previously on fluconazole 100mg X 7 days and then changed to itraconazole 100mg X 7 days (completed course last Tuesday 8/14/18).  Patient was prescribed magic mouthwash and lidocaine solution on 8/7 from [facility] ED but patient stated these medications did not help.  Confirmed NKDA and updated pharmacy. Medications added:     · none    Medications removed:    · none    Medications adjusted:    · none    Information obtained from: patient, family, medication bottles     Allergies: Review of patient's allergies indicates no known allergies.       Thank Asha Rosenthal, PharmD   Contact: [phone]

## 2018-08-24 PROCEDURE — 0HB5XZX EXCISION OF CHEST SKIN, EXTERNAL APPROACH, DIAGNOSTIC: ICD-10-PCS | Performed by: PSYCHIATRY & NEUROLOGY

## 2018-08-24 PROCEDURE — 74011636637 HC RX REV CODE- 636/637: Performed by: INTERNAL MEDICINE

## 2018-08-24 PROCEDURE — 74011000250 HC RX REV CODE- 250: Performed by: PHYSICIAN ASSISTANT

## 2018-08-24 PROCEDURE — 74011000258 HC RX REV CODE- 258: Performed by: INTERNAL MEDICINE

## 2018-08-24 PROCEDURE — 74011250636 HC RX REV CODE- 250/636: Performed by: INTERNAL MEDICINE

## 2018-08-24 PROCEDURE — 65270000029 HC RM PRIVATE

## 2018-08-24 PROCEDURE — 36415 COLL VENOUS BLD VENIPUNCTURE: CPT | Performed by: INTERNAL MEDICINE

## 2018-08-24 PROCEDURE — 74011250637 HC RX REV CODE- 250/637: Performed by: INTERNAL MEDICINE

## 2018-08-24 PROCEDURE — 88305 TISSUE EXAM BY PATHOLOGIST: CPT | Performed by: PHYSICIAN ASSISTANT

## 2018-08-24 PROCEDURE — 86361 T CELL ABSOLUTE COUNT: CPT | Performed by: INTERNAL MEDICINE

## 2018-08-24 RX ORDER — LIDOCAINE HYDROCHLORIDE AND EPINEPHRINE 10; 10 MG/ML; UG/ML
10 INJECTION, SOLUTION INFILTRATION; PERINEURAL ONCE
Status: COMPLETED | OUTPATIENT
Start: 2018-08-24 | End: 2018-08-24

## 2018-08-24 RX ORDER — MORPHINE SULFATE 4 MG/ML
2 INJECTION, SOLUTION INTRAMUSCULAR; INTRAVENOUS
Status: DISCONTINUED | OUTPATIENT
Start: 2018-08-24 | End: 2018-08-25 | Stop reason: HOSPADM

## 2018-08-24 RX ORDER — PREDNISONE 20 MG/1
60 TABLET ORAL
Status: DISCONTINUED | OUTPATIENT
Start: 2018-08-24 | End: 2018-08-25 | Stop reason: HOSPADM

## 2018-08-24 RX ADMIN — ENOXAPARIN SODIUM 40 MG: 100 INJECTION SUBCUTANEOUS at 22:24

## 2018-08-24 RX ADMIN — LIDOCAINE HYDROCHLORIDE 5 ML: 20 SOLUTION ORAL; TOPICAL at 08:08

## 2018-08-24 RX ADMIN — LIDOCAINE HYDROCHLORIDE AND EPINEPHRINE 100 MG: 10; 10 INJECTION, SOLUTION INFILTRATION; PERINEURAL at 14:30

## 2018-08-24 RX ADMIN — Medication 10 ML: at 05:02

## 2018-08-24 RX ADMIN — NYSTATIN 500000 UNITS: 100000 SUSPENSION ORAL at 12:12

## 2018-08-24 RX ADMIN — NYSTATIN 500000 UNITS: 100000 SUSPENSION ORAL at 08:08

## 2018-08-24 RX ADMIN — SODIUM CHLORIDE 125 ML/HR: 900 INJECTION, SOLUTION INTRAVENOUS at 06:25

## 2018-08-24 RX ADMIN — PREDNISONE 60 MG: 20 TABLET ORAL at 09:43

## 2018-08-24 RX ADMIN — SODIUM CHLORIDE 125 ML/HR: 900 INJECTION, SOLUTION INTRAVENOUS at 15:42

## 2018-08-24 RX ADMIN — SODIUM CHLORIDE 100 MG: 900 INJECTION, SOLUTION INTRAVENOUS at 16:43

## 2018-08-24 RX ADMIN — Medication 10 ML: at 15:44

## 2018-08-24 RX ADMIN — MORPHINE SULFATE 2 MG: 4 INJECTION INTRAVENOUS at 08:11

## 2018-08-24 RX ADMIN — LIDOCAINE HYDROCHLORIDE 5 ML: 20 SOLUTION ORAL; TOPICAL at 22:24

## 2018-08-24 RX ADMIN — LIDOCAINE HYDROCHLORIDE 5 ML: 20 SOLUTION ORAL; TOPICAL at 15:42

## 2018-08-24 RX ADMIN — LIDOCAINE HYDROCHLORIDE 5 ML: 20 SOLUTION ORAL; TOPICAL at 12:12

## 2018-08-24 RX ADMIN — NYSTATIN 500000 UNITS: 100000 SUSPENSION ORAL at 22:24

## 2018-08-24 RX ADMIN — SODIUM CHLORIDE 125 ML/HR: 900 INJECTION, SOLUTION INTRAVENOUS at 01:22

## 2018-08-24 RX ADMIN — NYSTATIN 500000 UNITS: 100000 SUSPENSION ORAL at 17:30

## 2018-08-24 NOTE — PROGRESS NOTES
Bedside and Verbal shift change report given to, RN (oncoming nurse) by ANGELIA Carlson (offgoing nurse). Report included the following information SBAR, Kardex and ED Summary.

## 2018-08-24 NOTE — PROGRESS NOTES
Bedside shift change report given to Marcy GALAN (oncoming nurse) by Rafal Iniguez RN (offgoing nurse). Report included the following information SBAR, Kardex, Intake/Output, MAR, Recent Results and Med Rec Status.

## 2018-08-24 NOTE — PROGRESS NOTES
Nutrition Assessment:    RECOMMENDATIONS/INTERVENTION(S):   Continue Full liquid diet  Monitor PO intakes, weight, oral status  Add Ensure Enlive TID and magic cup    Add Clarissa Sanchez if not already started    ASSESSMENT:   8/24: 45 yr old male admitted for odynophagia, weight loss. Attempted to see pt, but Gen Surg starting procedure. Per notes, pt has lost 16 lbs (11%) over last 3 weeks. Poor PO for same time period. Skin rash and oral thrush present and preventing pt from eating 2/2 oral pain. Encourage room temp liquids. Encourage yogurt. Monitor weight. Labs reviewed. Meets Criteria for Severe Acute Malnutrition as evidenced by:   [] Moderate muscle wasting, loss of subcutaneous fat   [x] Nutritional intake of <50% of recommended intake for >5 days   [x] Weight loss of >1-2% in 1 week, >5% in 1 month, >7.5% in 3 months, or >10% in 6 months   [] Moderate-severe edema       SUBJECTIVE/OBJECTIVE:   Diet Order: Full liquids  % Eaten:  No data found. Pertinent Medications: [x] Reviewed    Past Medical History:   Diagnosis Date    Positive H. pylori test 2018        Chemistries:  Lab Results   Component Value Date/Time    Sodium 140 08/23/2018 03:43 AM    Potassium 4.0 08/23/2018 03:43 AM    Chloride 107 08/23/2018 03:43 AM    CO2 29 08/23/2018 03:43 AM    Anion gap 4 (L) 08/23/2018 03:43 AM    Glucose 83 08/23/2018 03:43 AM    BUN 7 08/23/2018 03:43 AM    Creatinine 0.82 08/23/2018 03:43 AM    BUN/Creatinine ratio 9 (L) 08/23/2018 03:43 AM    GFR est AA >60 08/23/2018 03:43 AM    GFR est non-AA >60 08/23/2018 03:43 AM    Calcium 8.2 (L) 08/23/2018 03:43 AM    AST (SGOT) 21 08/22/2018 07:57 PM    Alk.  phosphatase 70 08/22/2018 07:57 PM    Protein, total 7.0 08/22/2018 07:57 PM    Albumin 3.3 (L) 08/22/2018 07:57 PM    Globulin 3.7 08/22/2018 07:57 PM    A-G Ratio 0.9 (L) 08/22/2018 07:57 PM    ALT (SGPT) 29 08/22/2018 07:57 PM      Anthropometrics: Height: 5' 6\" (167.6 cm) Weight: 57.6 kg (126 lb 15.8 oz)    IBW (%IBW): 65 kg (143 lb 4.8 oz) ( ) UBW (%UBW):   (  %)    BMI: Body mass index is 20.5 kg/(m^2). This BMI is indicative of:     [] Underweight    [x] Normal    [] Overweight    []  Obesity    []  Extreme Obesity (BMI>40)    Estimated Nutrition Needs (Based on): 1870 Kcals/day (UTX(1287Q3.7)) , 58 g (-69g/day(0.8-1.0g/kg)) Protein  Carbohydrate:  At Least 130 g/day  Fluids: 1850 mL/day (1mL/kg rounded to 50 mL)    Last BM: 8/22   [x]Active     []Hyperactive  []Hypoactive       [] Absent   BS  Skin:    [] Intact   [] Incision  [] Breakdown   [] DTI   [] Tears/Excoriation/Abrasion  []Edema [x] Other: rash   Wt Readings from Last 30 Encounters:   08/22/18 57.6 kg (126 lb 15.8 oz)   08/07/18 60.3 kg (133 lb)   07/06/18 62.8 kg (138 lb 7.2 oz)      NUTRITION DIAGNOSES:   Problem:  Swallowing difficulty     Etiology: related to mechanical causes alteration in chew/swallow function     Signs/Symptoms: as evidenced by sore throat, mouth sores, pain when chew/swallowing      NUTRITION INTERVENTIONS:  Meals/Snacks: General/healthful diet   Supplements: Commercial supplement              GOAL:   Pt will consume >50% of ONS within 3-5 days    Cultural, Yarsanism, or Ethnic Dietary Needs: None     LEARNING NEEDS (Diet, Food/Nutrient-Drug Interaction):    [] None Identified   [x] Identified and Education Provided/Documented   [] Identified and Pt declined/was not appropriate      [x] Interdisciplinary Care Plan Reviewed/Documented    [x] Discharge Needs:    TBD   [] No Nutrition Related Discharge Needs    NUTRITION RISK:   Pt Is At Nutrition Risk  [x]     No Nutrition Risk Identified  []       PT SEEN FOR:    []  MD Consult: []Calorie Count      []Diabetic Diet Education        []Diet Education     []Electrolyte Management     []General Nutrition Management and Supplements     []Management of Tube Feeding     []TPN Recommendations    [x]  RN Referral:  [x]MST score >=2     []Enteral/Parenteral Nutrition PTA     []Pregnant: Gestational DM or Multigestation                 [] Pressure Ulcer      []  Low BMI      []  Length of Stay       [] Dysphagia Diet     [] Ventilator      [] Follow-Up        Previous Recommendations:   [] Implemented          [] Not Implemented          [x] Not Applicable    Previous Goal:   [] Met              [] Progressing Towards Goal              [] Not Progressing Towards Goal   [x] Not Applicable              Mandie Bell, [address] [place] [address]  Pager: [phone]  Office: [phone]

## 2018-08-24 NOTE — PROGRESS NOTES
Problem: Falls - Risk of  Goal: *Absence of Falls  Document Morgan Fall Risk and appropriate interventions in the flowsheet.    Outcome: Progressing Towards Goal  Fall Risk Interventions:            Medication Interventions: Patient to call before getting OOB, Teach patient to arise slowly, Assess postural VS orthostatic hypotension

## 2018-08-24 NOTE — PROGRESS NOTES
[facility] Progress Note           Constantin Cornejo DO    [phone] Office  [phone]  Fax    2018      Assessment & Plan:   Rash / Suspected pemphigus vulgaris. .                        -- gen surg for skin bx today                        -- will start prednisone po                        -- await bed at [address] [place] [address] for additional derm evaluation      Odynophagia / Oral lesions:  Aphthous ulcers and thrush. EGD showed normal esophagus and stomach. -- continue magic mouth wash but increase frequency                        -- started anidulafungin for refractory thrush. Consider voriconazole           Subjective:     Feeling about the same    Objective:     Vitals:   Visit Vitals    /69 (BP 1 Location: Left arm, BP Patient Position: At rest)    Pulse 77    Temp 98.4 °F (36.9 °C)    Resp 18    Ht 5' 6\" (1.676 m)    Wt 57.6 kg (126 lb 15.8 oz)    SpO2 100%    BMI 20.5 kg/m2        Tmax:  Temp (24hrs), Av.6 °F (37 °C), Min:98.3 °F (36.8 °C), Max:99.4 °F (37.4 °C)      Exam:     Physical Examination:   General:  Alert, cooperative, no distress   Head:  Sloughing of lips. Thrush improving   Eyes:  Conjunctivae clear   Neck:        Lungs:   No distress. Chest wall:     Heart:     Abdomen:      Extremities: Moves all. Skin: Scattered dime sized shallow ulcers on trunk and neck   Neurologic: CNII-XII intact. Normal strength     Labs:        No lab exists for component: ITNL   No results for input(s): CPK, CKMB, TROIQ in the last 72 hours. Recent Labs      18   0343  18   NA  140  137   K  4.0  3.7   CL  107  101   CO2  29  30   BUN  7  10   CREA  0.82  0.86   GLU  83  81   ALB   --   3.3*   WBC  6.8  8.8   HGB  12.6  14.5   HCT  37.3  43.3   PLT  321  356     No results for input(s): INR, PTP, APTT in the last 72 hours.     No lab exists for component: INREXT  Needs: urine analysis, urine sodium, protein and creatinine  No results found for: ROLLY, CREAU      Cultures:     No results found for: SDES  Lab Results   Component Value Date/Time    Culture result: NO GROWTH 2 DAYS 08/22/2018 08:37 PM       Radiology:     Medications       Current Facility-Administered Medications   Medication Dose Route Frequency Last Dose    lidocaine-EPINEPHrine (XYLOCAINE) 1 %-1:100,000 injection 100 mg  10 mL SubCUTAneous ONCE      predniSONE (DELTASONE) tablet 60 mg  60 mg Oral DAILY WITH BREAKFAST 60 mg at 08/24/18 0943    magic mouthwash cpd (without sucralfate)  5 mL Oral AC&HS 5 mL at 08/24/18 1212    ondansetron (ZOFRAN) injection 4 mg  4 mg IntraVENous Q4H PRN      anidulafungin (ERAXIS) 100 mg in 0.9% sodium chloride 130 mL IVPB  100 mg IntraVENous Q24H      sodium chloride (NS) flush 5-10 mL  5-10 mL IntraVENous Q8H 10 mL at 08/24/18 0502    sodium chloride (NS) flush 5-10 mL  5-10 mL IntraVENous PRN      enoxaparin (LOVENOX) injection 40 mg  40 mg SubCUTAneous Q24H 40 mg at 08/23/18 2209    nystatin (MYCOSTATIN) 100,000 unit/mL oral suspension 500,000 Units  500,000 Units Oral ,000 Units at 08/24/18 1212    0.9% sodium chloride infusion  125 mL/hr IntraVENous CONTINUOUS 125 mL/hr at 08/24/18 1398    morphine injection 2 mg  2 mg IntraVENous Q4H PRN 2 mg at 08/24/18 2916           Case discussed with:      Brooke Nunn DO

## 2018-08-24 NOTE — PROGRESS NOTES
8/24/2018  3:40 PM  Pt transfer to [address] [place] [address] still pending. [facility] transfer center will contact unit nurse to inform them of available bed. If a bed becomes available this weekend, nurses, please call [facility] transportation service, [phone], to arrange transport. A blank [facility] form placed in chart if needed.

## 2018-08-24 NOTE — PROCEDURES
Procedure Note    Patient: Evan Reyes MRN: [id number]  SSN: [id number]    YOB: 1980  Age: 45 y.o. Sex: male      Date of Procedure: 8/24/2018    Pre-Procedure Diagnosis: Rash    Post-Procedure Diagnosis: SAME    Procedure(s): Biopsy of skin lesion       Procedure Details: The risks,benefits and alternatives were explained and consent was obtained for the procedure. Skin lesion on sternum was identified as site for biopsy and the area was cleansed with betadine and draped in the usual manner. Local anesthesia with 1% lidocaine with epinephrine was infiltrated at the biopsy site. Specimen obtained using 5mm punch biopsy. Normal-appearing skin border marked with stitch. Specimen placed in formalin specimen container. Wound was closed with 1 5-0 buried vicryl suture and steri-strips. A sterile dressing was then applied. Patient tolerated the procedure well. Specimen was delivered to pathology lab. Dr. Claudia Nelson was present during the procedure. Estimated Blood Loss:  Minimal           Complications:  None; patient tolerated the procedure well.            Condition: stable           Signed By: ESPERANZA Ortiz

## 2018-08-24 NOTE — PROGRESS NOTES
Medical Progress Note      NAME: Sonia Jennings   :  1980  MRM:  [id number]    Date/Time: 2018  8:52 AM       Assessment / Plan:     Rash / Suspected pemphigus vulgaris:  Has new lesions on trunk. Flaccid discoid blisters. This in conjunction of mucocutaneous lesions makes this likely to be pemphigus vulgaris. -- gen surg for skin bx today   -- will start prednisone po   -- await bed at [facility] for additional derm evaluation    Odynophagia / Oral lesions:  Aphthous ulcers and thrush. EGD showed normal esophagus and stomach. -- continue magic mouth wash for symptom relief     Tinea Capitis with kerion:  No sig erythema noted to suspect bacterial infection or folliculitis. -- continue anidulafungin       Subjective:     Chief Complaint:  Mouth pain. Not any better. Still has sore throat and mouth pain. Has noted new lesions on skin. ROS:  (bold if positive, if negative)              Objective:       Vitals:          Last 24hrs VS reviewed since prior progress note.  Most recent are:    Visit Vitals    /57 (BP 1 Location: Right arm, BP Patient Position: At rest;Lying left side)    Pulse 67    Temp 98.4 °F (36.9 °C)    Resp 14    Ht 5' 6\" (1.676 m)    Wt 57.6 kg (126 lb 15.8 oz)    SpO2 100%    BMI 20.5 kg/m2     SpO2 Readings from Last 6 Encounters:   18 100%   18 99%   18 99%            Intake/Output Summary (Last 24 hours) at 18 0842  Last data filed at 18 1232   Gross per 24 hour   Intake              300 ml   Output                0 ml   Net              300 ml          Exam:     Physical Exam:    Gen:  Well-developed, well-nourished, in no acute distress  HEENT:  Pink conjunctivae, hearing intact to voice, moist mucous membranes with ulcers and erythema, scaly yellow crusted lesions of scalp  Resp:  No accessory muscle use, clear breath sounds without wheezes rales or rhonchi  Card:  No murmurs, normal S1, S2 without thrills or peripheral edema  Abd:  Soft, non-tender, non-distended, normoactive bowel sounds are present  Skin:  Lesion on sternum with yellow crust.  Has new discoid lesion below left axilla and discoid lesion on lower back with flaccid blistering appearance. Neuro:   Face symmetric, tongue midline, speech fluent  Psych:  Good insight, oriented to person, place and time, alert       Medications Reviewed: (see below)    Lab Data Reviewed: (see below)    ______________________________________________________________________    Medications:     Current Facility-Administered Medications   Medication Dose Route Frequency    lidocaine-EPINEPHrine (XYLOCAINE) 1 %-1:100,000 injection 100 mg  10 mL SubCUTAneous ONCE    magic mouthwash cpd (without sucralfate)  5 mL Oral AC&HS    ondansetron (ZOFRAN) injection 4 mg  4 mg IntraVENous Q4H PRN    anidulafungin (ERAXIS) 100 mg in 0.9% sodium chloride 130 mL IVPB  100 mg IntraVENous Q24H    sodium chloride (NS) flush 5-10 mL  5-10 mL IntraVENous Q8H    sodium chloride (NS) flush 5-10 mL  5-10 mL IntraVENous PRN    enoxaparin (LOVENOX) injection 40 mg  40 mg SubCUTAneous Q24H    nystatin (MYCOSTATIN) 100,000 unit/mL oral suspension 500,000 Units  500,000 Units Oral QID    0.9% sodium chloride infusion  125 mL/hr IntraVENous CONTINUOUS    morphine injection 2 mg  2 mg IntraVENous Q4H PRN            Lab Review:     Recent Labs      08/23/18   0343  08/22/18 1957   WBC  6.8  8.8   HGB  12.6  14.5   HCT  37.3  43.3   PLT  321  356     Recent Labs      08/23/18 0343 08/22/18 1957   NA  140  137   K  4.0  3.7   CL  107  101   CO2  29  30   GLU  83  81   BUN  7  10   CREA  0.82  0.86   CA  8.2*  8.3*   ALB   --   3.3*   TBILI   --   0.2   SGOT   --   21   ALT   --   29     No results found for: GLUCPOC      Total time spent with patient: 30 Minutes                  Care Plan discussed with: Patient    Discussed:  Care Plan    Prophylaxis:  Lovenox    Disposition:  Home w/Family           ___________________________________________________    Attending Physician: Osman Ayon MD

## 2018-08-25 VITALS
SYSTOLIC BLOOD PRESSURE: 113 MMHG | WEIGHT: 126.98 LBS | HEIGHT: 66 IN | OXYGEN SATURATION: 99 % | HEART RATE: 89 BPM | DIASTOLIC BLOOD PRESSURE: 74 MMHG | TEMPERATURE: 98.6 F | RESPIRATION RATE: 16 BRPM | BODY MASS INDEX: 20.41 KG/M2

## 2018-08-25 LAB
ALBUMIN SERPL-MCNC: 2.9 G/DL (ref 3.5–5)
ALBUMIN/GLOB SERPL: 0.9 {RATIO} (ref 1.1–2.2)
ALP SERPL-CCNC: 67 U/L (ref 45–117)
ALT SERPL-CCNC: 25 U/L (ref 12–78)
ANION GAP SERPL CALC-SCNC: 7 MMOL/L (ref 5–15)
AST SERPL-CCNC: 14 U/L (ref 15–37)
BASOPHILS # BLD: 0 K/UL (ref 0–0.1)
BASOPHILS NFR BLD: 0 % (ref 0–1)
BILIRUB SERPL-MCNC: 0.2 MG/DL (ref 0.2–1)
BUN SERPL-MCNC: 5 MG/DL (ref 6–20)
BUN/CREAT SERPL: 7 (ref 12–20)
CALCIUM SERPL-MCNC: 8 MG/DL (ref 8.5–10.1)
CHLORIDE SERPL-SCNC: 107 MMOL/L (ref 97–108)
CO2 SERPL-SCNC: 25 MMOL/L (ref 21–32)
CREAT SERPL-MCNC: 0.71 MG/DL (ref 0.7–1.3)
DIFFERENTIAL METHOD BLD: ABNORMAL
EOSINOPHIL # BLD: 0 K/UL (ref 0–0.4)
EOSINOPHIL NFR BLD: 0 % (ref 0–7)
ERYTHROCYTE [DISTWIDTH] IN BLOOD BY AUTOMATED COUNT: 12 % (ref 11.5–14.5)
GLOBULIN SER CALC-MCNC: 3.2 G/DL (ref 2–4)
GLUCOSE SERPL-MCNC: 97 MG/DL (ref 65–100)
HCT VFR BLD AUTO: 38.6 % (ref 36.6–50.3)
HGB BLD-MCNC: 13.3 G/DL (ref 12.1–17)
IMM GRANULOCYTES # BLD: 0 K/UL (ref 0–0.04)
IMM GRANULOCYTES NFR BLD AUTO: 0 % (ref 0–0.5)
LYMPHOCYTES # BLD: 1.3 K/UL (ref 0.8–3.5)
LYMPHOCYTES NFR BLD: 14 % (ref 12–49)
MAGNESIUM SERPL-MCNC: 1.9 MG/DL (ref 1.6–2.4)
MCH RBC QN AUTO: 29.4 PG (ref 26–34)
MCHC RBC AUTO-ENTMCNC: 34.5 G/DL (ref 30–36.5)
MCV RBC AUTO: 85.2 FL (ref 80–99)
MONOCYTES # BLD: 0.5 K/UL (ref 0–1)
MONOCYTES NFR BLD: 5 % (ref 5–13)
NEUTS SEG # BLD: 7.6 K/UL (ref 1.8–8)
NEUTS SEG NFR BLD: 81 % (ref 32–75)
NRBC # BLD: 0 K/UL (ref 0–0.01)
NRBC BLD-RTO: 0 PER 100 WBC
PHOSPHATE SERPL-MCNC: 3.6 MG/DL (ref 2.6–4.7)
PLATELET # BLD AUTO: 332 K/UL (ref 150–400)
PMV BLD AUTO: 9 FL (ref 8.9–12.9)
POTASSIUM SERPL-SCNC: 3.7 MMOL/L (ref 3.5–5.1)
PROT SERPL-MCNC: 6.1 G/DL (ref 6.4–8.2)
RBC # BLD AUTO: 4.53 M/UL (ref 4.1–5.7)
SODIUM SERPL-SCNC: 139 MMOL/L (ref 136–145)
WBC # BLD AUTO: 9.5 K/UL (ref 4.1–11.1)

## 2018-08-25 PROCEDURE — 74011250637 HC RX REV CODE- 250/637: Performed by: INTERNAL MEDICINE

## 2018-08-25 PROCEDURE — 74011636637 HC RX REV CODE- 636/637: Performed by: INTERNAL MEDICINE

## 2018-08-25 PROCEDURE — 83735 ASSAY OF MAGNESIUM: CPT | Performed by: INTERNAL MEDICINE

## 2018-08-25 PROCEDURE — 80053 COMPREHEN METABOLIC PANEL: CPT | Performed by: INTERNAL MEDICINE

## 2018-08-25 PROCEDURE — 36415 COLL VENOUS BLD VENIPUNCTURE: CPT | Performed by: INTERNAL MEDICINE

## 2018-08-25 PROCEDURE — 84100 ASSAY OF PHOSPHORUS: CPT | Performed by: INTERNAL MEDICINE

## 2018-08-25 PROCEDURE — 74011250636 HC RX REV CODE- 250/636: Performed by: INTERNAL MEDICINE

## 2018-08-25 PROCEDURE — 85025 COMPLETE CBC W/AUTO DIFF WBC: CPT | Performed by: INTERNAL MEDICINE

## 2018-08-25 RX ORDER — NYSTATIN 100000 [USP'U]/ML
500000 SUSPENSION ORAL 4 TIMES DAILY
Qty: 60 ML | Refills: 0 | Status: SHIPPED
Start: 2018-08-25

## 2018-08-25 RX ADMIN — SODIUM CHLORIDE 125 ML/HR: 900 INJECTION, SOLUTION INTRAVENOUS at 09:08

## 2018-08-25 RX ADMIN — LIDOCAINE HYDROCHLORIDE 5 ML: 20 SOLUTION ORAL; TOPICAL at 12:32

## 2018-08-25 RX ADMIN — LIDOCAINE HYDROCHLORIDE 5 ML: 20 SOLUTION ORAL; TOPICAL at 09:03

## 2018-08-25 RX ADMIN — NYSTATIN 500000 UNITS: 100000 SUSPENSION ORAL at 12:32

## 2018-08-25 RX ADMIN — PREDNISONE 60 MG: 20 TABLET ORAL at 09:03

## 2018-08-25 RX ADMIN — NYSTATIN 500000 UNITS: 100000 SUSPENSION ORAL at 09:03

## 2018-08-25 RX ADMIN — Medication 10 ML: at 12:32

## 2018-08-25 NOTE — DISCHARGE SUMMARY
Physician Discharge Summary     Patient ID:  Taina Whaley  [id number]  10 y.o.  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 Clarissa Vazquez is a 46 yo  M with no significant PMH presenting with rash, odynophagia, and weight losss, found to have mucocutaneous ulcers    Rash / Mucocutaneous ulcers: Ddx include pemphigus vulgaris vs bullous pemphigoid vs Linear IgA bullous dermatosis vs other cutaneous blistering disorders. Lesions on trunk, oropharynx, lips, and now with forehead (little papules) and ?eye involvement. Biopsy done by general surgery on 8/24. Started on prednisone 60mg daily 8/24. Seen by ID who recommends dermatology evaluation as well. HIV negative.     Odynophagia / Oral lesions:  Aphthous ulcers and thrush. EGD on 8/23 showed normal esophagus and stomach. On magic mouth wash and nystatin swish and swallow. Was started on anidulafungin for refractory thrush (was on fluconazole). ID recommends possible voriconazole. Of note, he had an EGD on 7/23/18 which showed gastritis, a clean based ulcer in the esophagus, and was positive for H pylori.  He did complete triple therapy per report.       Tinea Capitis with kerion:  No sig erythema noted to suspect bacterial infection or folliculitis. Antifungal as above    Pt discharged in stable condition. Procedures performed: see above    Imaging studies:  CT neck 7/6/18  IMPRESSION  IMPRESSION: No evidence of radiopaque foreign body. Sinus disease        PCP: None    Consults: ID, GI and General Surgery    Discharge Exam:  Patient Vitals for the past 24 hrs:   Temp Pulse Resp BP SpO2   08/25/18 0723 98.5 °F (36.9 °C) 72 15 98/55 100 %   08/25/18 0350 98.4 °F (36.9 °C) 65 16 122/77 98 %   08/24/18 2247 98.9 °F (37.2 °C) 68 17 117/74 97 %   08/24/18 1912 98.4 °F (36.9 °C) 78 18 120/73 100 %   08/24/18 1513 98.3 °F (36.8 °C) 84 18 115/72 99 %   08/24/18 1155 98.4 °F (36.9 °C) 77 18 106/69 100 %       GEN: NAD  CV: RRR, no MRG  RESP: CTAB  DERM: aphthous ulcers, thrush. Tiny papules on forehead. ~dime-sized erupted ulcers on truncal region      Disposition: [facility]    Patient Instructions:   Current Discharge Medication List      START taking these medications    Details   nystatin (MYCOSTATIN) 100,000 unit/mL suspension Take 5 mL by mouth four (4) times daily. swish and spit  Qty: 60 mL, Refills: 0             Activity: See discharge instructions  Diet: See discharge instructions  Wound Care: See discharge instructions    Follow-up Information     Follow up With Details Comments Contact Info    Transferring to [address] [place] [address]. Needs follow up with dermatology       None   None (395) Patient stated that they have no PCP            I spent 35 minutes on this discharge.     Signed:  Mitchell Akins MD  8/25/2018  10:14 AM

## 2018-08-25 NOTE — PROGRESS NOTES
Problem: Falls - Risk of  Goal: *Absence of Falls  Document Morgan Fall Risk and appropriate interventions in the flowsheet.    Outcome: Progressing Towards Goal  Fall Risk Interventions:            Medication Interventions: Patient to call before getting OOB, Teach patient to arise slowly

## 2018-08-25 NOTE — PROGRESS NOTES
Bedside and Verbal shift change report given to [facility], RN (oncoming nurse) by Marcio Rushing RN (offgoing nurse). Report included the following information SBAR, Kardex, ED Summary, Intake/Output, MAR, Accordion and Recent Results.

## 2018-08-25 NOTE — PROGRESS NOTES
Report called to Bisi Victoria RN at [facility]. Opporunity provided for questions. Verbalized understanding. Called back with transport time. Discharge reviewed with patient. Opportunity provided for questions. Patient verbalized understanding. Signed copy placed in the front of the patient's chart.      [place] completed

## 2018-08-25 NOTE — DISCHARGE INSTRUCTIONS
HOSPITALIST DISCHARGE INSTRUCTIONS  NAME: Arnie Chow   :  1980   MRN:  [id number]     Date/Time:  2018 10:09 AM    ADMIT DATE: 2018     DISCHARGE DATE: 2018     PRINCIPAL DISCHARGE DIAGNOSES:  Skin rash and oral ulcers    MEDICATIONS:  · It is important that you take the medication exactly as they are prescribed. Note the changes and additions to your medications. Be sure you understand these changes before you are discharged today. · Keep your medication in the bottles provided by the pharmacist and keep a list of the medication names, dosages, and times to be taken in your wallet. · Do not take other medications without consulting your doctor. Pain Management: per above medications    What to do at Home    Recommended diet:  Clear liquids    Recommended activity: Activity as tolerated    If you experience any of the following symptoms then please call your primary care physician or return to the emergency room if you cannot get hold of your doctor:  Fever, chills, worsening rash/ulcers, pain, bleeding, severe chest pain, shortness of breath, or other severe concerning symptoms that brought you to the hospital in the first place    Follow Up: Follow-up Information     Follow up With Details Comments Contact Info    Transferring to [facility][1]. Needs follow up with dermatology               Information obtained by :  I understand that if any problems occur once I am at home I am to contact my physician. I understand and acknowledge receipt of the instructions indicated above.                                                                                                                                            Physician's or R.N.'s Signature                                                                  Date/Time                                                                                                                                              Patient or Representative Signature                                                          Date/Time

## 2018-08-26 LAB
BASOPHILS # BLD AUTO: 0 X10E3/UL (ref 0–0.2)
BASOPHILS NFR BLD AUTO: 0 %
CD3+CD4+ CELLS # BLD: 998 /UL (ref 359–1519)
CD3+CD4+ CELLS NFR BLD: 58.7 % (ref 30.8–58.5)
EOSINOPHIL # BLD AUTO: 0.8 X10E3/UL (ref 0–0.4)
EOSINOPHIL NFR BLD AUTO: 11 %
ERYTHROCYTE [DISTWIDTH] IN BLOOD BY AUTOMATED COUNT: 13.4 % (ref 12.3–15.4)
HCT VFR BLD AUTO: 38.5 % (ref 37.5–51)
HGB BLD-MCNC: 12.5 G/DL (ref 13–17.7)
IMM GRANULOCYTES # BLD: 0 X10E3/UL (ref 0–0.1)
IMM GRANULOCYTES NFR BLD: 0 %
LYMPHOCYTES # BLD AUTO: 1.7 X10E3/UL (ref 0.7–3.1)
LYMPHOCYTES NFR BLD AUTO: 24 %
MCH RBC QN AUTO: 27.9 PG (ref 26.6–33)
MCHC RBC AUTO-ENTMCNC: 32.5 G/DL (ref 31.5–35.7)
MCV RBC AUTO: 86 FL (ref 79–97)
MONOCYTES # BLD AUTO: 0.5 X10E3/UL (ref 0.1–0.9)
MONOCYTES NFR BLD AUTO: 6 %
NEUTROPHILS # BLD AUTO: 4.2 X10E3/UL (ref 1.4–7)
NEUTROPHILS NFR BLD AUTO: 59 %
PLATELET # BLD AUTO: 319 X10E3/UL (ref 150–379)
RBC # BLD AUTO: 4.48 X10E6/UL (ref 4.14–5.8)
WBC # BLD AUTO: 7.1 X10E3/UL (ref 3.4–10.8)

## 2018-08-27 LAB
BACTERIA SPEC CULT: NORMAL
SERVICE CMNT-IMP: NORMAL

## (undated) DEVICE — BITEBLOCK ENDOSCP 60FR MAXI WHT POLYETH STURDY W/ VELC WVN

## (undated) DEVICE — CATH IV AUTOGRD BC BLU 22GA 25 -- INSYTE

## (undated) DEVICE — BASIN EMSIS 16OZ GRAPHITE PLAS KID SHP MOLD GRAD FOR ORAL

## (undated) DEVICE — BAG SPEC BIOHZRD 10 X 10 IN --

## (undated) DEVICE — CONTAINER SPEC 20 ML LID NEUT BUFF FORMALIN 10 % POLYPR STS

## (undated) DEVICE — KENDALL RADIOLUCENT FOAM MONITORING ELECTRODE -RECTANGULAR SHAPE: Brand: KENDALL

## (undated) DEVICE — BAG BELONG PT PERS CLEAR HANDL

## (undated) DEVICE — KIT COLON W/ 1.1OZ LUB AND 2 END

## (undated) DEVICE — SOLIDIFIER MEDC 1200ML -- CONVERT TO 356117

## (undated) DEVICE — 1200 GUARD II KIT W/5MM TUBE W/O VAC TUBE: Brand: GUARDIAN

## (undated) DEVICE — FORCEPS BX L240CM JAW DIA2.8MM L CAP W/ NDL MIC MESH TOOTH

## (undated) DEVICE — CUFF RMFG BP INF SZ 11 DISP -- LAWSON OEM ITEM 238915